# Patient Record
Sex: MALE | Race: OTHER | NOT HISPANIC OR LATINO
[De-identification: names, ages, dates, MRNs, and addresses within clinical notes are randomized per-mention and may not be internally consistent; named-entity substitution may affect disease eponyms.]

---

## 2020-07-30 ENCOUNTER — TRANSCRIPTION ENCOUNTER (OUTPATIENT)
Age: 60
End: 2020-07-30

## 2020-07-30 ENCOUNTER — APPOINTMENT (OUTPATIENT)
Dept: UROLOGY | Facility: CLINIC | Age: 60
End: 2020-07-30
Payer: MEDICARE

## 2020-07-30 VITALS
SYSTOLIC BLOOD PRESSURE: 146 MMHG | DIASTOLIC BLOOD PRESSURE: 85 MMHG | RESPIRATION RATE: 17 BRPM | WEIGHT: 190 LBS | HEIGHT: 68 IN | BODY MASS INDEX: 28.79 KG/M2 | HEART RATE: 79 BPM

## 2020-07-30 VITALS — TEMPERATURE: 98 F

## 2020-07-30 PROCEDURE — 99204 OFFICE O/P NEW MOD 45 MIN: CPT

## 2020-07-30 RX ORDER — TAMSULOSIN HYDROCHLORIDE 0.4 MG/1
0.4 CAPSULE ORAL
Refills: 0 | Status: ACTIVE | COMMUNITY

## 2020-07-30 RX ORDER — FINASTERIDE 5 MG/1
5 TABLET, FILM COATED ORAL
Refills: 0 | Status: ACTIVE | COMMUNITY

## 2020-07-30 NOTE — PHYSICAL EXAM
[General Appearance - Well Developed] : well developed [General Appearance - Well Nourished] : well nourished [Normal Appearance] : normal appearance [Well Groomed] : well groomed [General Appearance - In No Acute Distress] : no acute distress [Edema] : no peripheral edema [Respiration, Rhythm And Depth] : normal respiratory rhythm and effort [Exaggerated Use Of Accessory Muscles For Inspiration] : no accessory muscle use [Abdomen Soft] : soft [Costovertebral Angle Tenderness] : no ~M costovertebral angle tenderness [Abdomen Tenderness] : non-tender [Urethral Meatus] : meatus normal [Scrotum] : the scrotum was normal [Urinary Bladder Findings] : the bladder was normal on palpation [Testes Mass (___cm)] : there were no testicular masses [Normal Station and Gait] : the gait and station were normal for the patient's age [] : no rash [No Focal Deficits] : no focal deficits [Oriented To Time, Place, And Person] : oriented to person, place, and time [Affect] : the affect was normal [Mood] : the mood was normal [Not Anxious] : not anxious [No Palpable Adenopathy] : no palpable adenopathy

## 2020-08-01 LAB
APPEARANCE: ABNORMAL
BACTERIA UR CULT: NORMAL
BACTERIA: NEGATIVE
BILIRUBIN URINE: ABNORMAL
BLOOD URINE: ABNORMAL
COLOR: ABNORMAL
GLUCOSE QUALITATIVE U: ABNORMAL
HYALINE CASTS: 0 /LPF
KETONES URINE: ABNORMAL
LEUKOCYTE ESTERASE URINE: ABNORMAL
MICROSCOPIC-UA: NORMAL
NITRITE URINE: POSITIVE
PH URINE: 7
PROTEIN URINE: ABNORMAL
RED BLOOD CELLS URINE: >720 /HPF
SPECIFIC GRAVITY URINE: 1.02
SQUAMOUS EPITHELIAL CELLS: 0 /HPF
UROBILINOGEN URINE: ABNORMAL
WHITE BLOOD CELLS URINE: 30 /HPF

## 2020-08-03 LAB — URINE CYTOLOGY: NORMAL

## 2020-08-04 LAB
PSA FREE FLD-MCNC: 25 %
PSA FREE SERPL-MCNC: 2.42 NG/ML
PSA SERPL-MCNC: 9.83 NG/ML

## 2020-08-04 NOTE — LETTER BODY
[FreeTextEntry1] : Venkata Garza MD\par [address] [facility]\[place] [address]\par \par Dear Dr. Garza,\par \par Cody Patton presents to the office today.  He is a 60-year-old man who is here reporting a history of gross hematuria for over 1 month.  He is passing blood clots and also feels a burning sensation at times with urination.  He has not had any noticeable odor of the urine.  He did take Cipro recently prescribed to him but did not have any improvement in the hematuria.  He stopped the Cipro about 2 or 3 weeks ago.  The hematuria has been intermittent but persistent.  \par \par His urologic history is notable for a prior issue of urinary retention.  He had a transurethral resection of the prostate performed in 2012 for retention when he was living in [place].  He said at that time he also was living with a catheter in place for about 6 months prior to the operation.  He says that the urinary stream does remain somewhat weak but he is urinating comfortably and does not have any pain or hesitancy at baseline.  He feels as though he does empty his bladder to completion.\par \par I did collect a urine sample today.  The color was dark red consistent with blood in the urine.  It was sent for culture, urinalysis, and cytology.  We did discuss the wide range of conditions associated with gross hematuria including both benign and malignant pathology.  I would strongly advise him to undergo additional evaluation to help determine the cause of the persistent blood in the urine.  We will pursue a CT urogram and cystoscopy for further work-up.  I explained the rationale for both of these tests to the patient.\par \par I also recommended a PSA level.  The PSA did come back elevated at 9.83 with 25% free fraction.  I am not yet sure what to make of this level given his other ongoing symptoms.  He may potentially have very significant prostate enlargement and gross hematuria associated with this enlargement.  Nonetheless, further work-up is indicated to help rule out other potential pathology.  He communicates his understanding of this plan and is in agreement.  He will undergo the CT urogram and I will see him back in the office for cystoscopy and review of those results.\par \par Thank you very much for the kind referral.  Please do not hesitate to contact me with any questions or concerns.\par \par Sincerely,\par \par \par \par \par Bharathi Townsend MD, FACS\par  of Urology\par Residency \par [facility]\par \par [facility]\par Director of Robotics and Minimally Invasive Surgery\par [address] [place] [address]\[place] [address]\par P: [phone]\par F: [phone]\par [facility]

## 2020-08-04 NOTE — REVIEW OF SYSTEMS
[Negative] : Endocrine [Urine Infection (bladder/kidney)] : bladder/kidney infection [Told you have blood in urine on a urine test] : told blood was present in a urine test [Slow urine stream] : slow urine stream [Discharge from urine canal] : discharge from urine canal [Joint Pain] : joint pain [Anxiety] : anxiety [Depression] : depression [FreeTextEntry2] : Frequent Urination

## 2020-08-11 ENCOUNTER — APPOINTMENT (OUTPATIENT)
Dept: UROLOGY | Facility: CLINIC | Age: 60
End: 2020-08-11
Payer: MEDICARE

## 2020-08-11 ENCOUNTER — OUTPATIENT (OUTPATIENT)
Dept: OUTPATIENT SERVICES | Facility: HOSPITAL | Age: 60
LOS: 1 days | End: 2020-08-11
Payer: MEDICARE

## 2020-08-11 ENCOUNTER — APPOINTMENT (OUTPATIENT)
Dept: CT IMAGING | Facility: IMAGING CENTER | Age: 60
End: 2020-08-11
Payer: MEDICARE

## 2020-08-11 ENCOUNTER — RESULT REVIEW (OUTPATIENT)
Age: 60
End: 2020-08-11

## 2020-08-11 VITALS — TEMPERATURE: 97.4 F

## 2020-08-11 VITALS
TEMPERATURE: 97 F | DIASTOLIC BLOOD PRESSURE: 89 MMHG | RESPIRATION RATE: 17 BRPM | SYSTOLIC BLOOD PRESSURE: 125 MMHG | HEART RATE: 75 BPM

## 2020-08-11 DIAGNOSIS — R31.0 GROSS HEMATURIA: ICD-10-CM

## 2020-08-11 DIAGNOSIS — R35.0 FREQUENCY OF MICTURITION: ICD-10-CM

## 2020-08-11 DIAGNOSIS — R30.0 DYSURIA: ICD-10-CM

## 2020-08-11 PROCEDURE — 99214 OFFICE O/P EST MOD 30 MIN: CPT | Mod: 25

## 2020-08-11 PROCEDURE — 52000 CYSTOURETHROSCOPY: CPT

## 2020-08-11 PROCEDURE — 74178 CT ABD&PLV WO CNTR FLWD CNTR: CPT

## 2020-08-11 PROCEDURE — 74178 CT ABD&PLV WO CNTR FLWD CNTR: CPT | Mod: 26

## 2020-08-14 DIAGNOSIS — R31.0 GROSS HEMATURIA: ICD-10-CM

## 2020-08-14 DIAGNOSIS — R30.0 DYSURIA: ICD-10-CM

## 2020-08-14 DIAGNOSIS — N40.1 BENIGN PROSTATIC HYPERPLASIA WITH LOWER URINARY TRACT SYMPTOMS: ICD-10-CM

## 2020-08-19 ENCOUNTER — OUTPATIENT (OUTPATIENT)
Dept: OUTPATIENT SERVICES | Facility: HOSPITAL | Age: 60
LOS: 1 days | End: 2020-08-19
Payer: MEDICARE

## 2020-08-19 VITALS
HEIGHT: 67 IN | DIASTOLIC BLOOD PRESSURE: 94 MMHG | SYSTOLIC BLOOD PRESSURE: 142 MMHG | HEART RATE: 62 BPM | RESPIRATION RATE: 16 BRPM | OXYGEN SATURATION: 98 % | TEMPERATURE: 96 F | WEIGHT: 188.94 LBS

## 2020-08-19 DIAGNOSIS — R31.0 GROSS HEMATURIA: ICD-10-CM

## 2020-08-19 DIAGNOSIS — M12.9 ARTHROPATHY, UNSPECIFIED: Chronic | ICD-10-CM

## 2020-08-19 DIAGNOSIS — N40.0 BENIGN PROSTATIC HYPERPLASIA WITHOUT LOWER URINARY TRACT SYMPTOMS: ICD-10-CM

## 2020-08-19 DIAGNOSIS — M17.11 UNILATERAL PRIMARY OSTEOARTHRITIS, RIGHT KNEE: Chronic | ICD-10-CM

## 2020-08-19 DIAGNOSIS — I10 ESSENTIAL (PRIMARY) HYPERTENSION: ICD-10-CM

## 2020-08-19 LAB
ANION GAP SERPL CALC-SCNC: 14 MMO/L — SIGNIFICANT CHANGE UP (ref 7–14)
BLD GP AB SCN SERPL QL: NEGATIVE — SIGNIFICANT CHANGE UP
BUN SERPL-MCNC: 14 MG/DL — SIGNIFICANT CHANGE UP (ref 7–23)
CALCIUM SERPL-MCNC: 10.1 MG/DL — SIGNIFICANT CHANGE UP (ref 8.4–10.5)
CHLORIDE SERPL-SCNC: 103 MMOL/L — SIGNIFICANT CHANGE UP (ref 98–107)
CO2 SERPL-SCNC: 25 MMOL/L — SIGNIFICANT CHANGE UP (ref 22–31)
CREAT SERPL-MCNC: 0.81 MG/DL — SIGNIFICANT CHANGE UP (ref 0.5–1.3)
GLUCOSE SERPL-MCNC: 98 MG/DL — SIGNIFICANT CHANGE UP (ref 70–99)
HCT VFR BLD CALC: 38 % — LOW (ref 39–50)
HGB BLD-MCNC: 12.1 G/DL — LOW (ref 13–17)
MCHC RBC-ENTMCNC: 27.4 PG — SIGNIFICANT CHANGE UP (ref 27–34)
MCHC RBC-ENTMCNC: 31.8 % — LOW (ref 32–36)
MCV RBC AUTO: 86.2 FL — SIGNIFICANT CHANGE UP (ref 80–100)
NRBC # FLD: 0 K/UL — SIGNIFICANT CHANGE UP (ref 0–0)
PLATELET # BLD AUTO: 341 K/UL — SIGNIFICANT CHANGE UP (ref 150–400)
PMV BLD: 10 FL — SIGNIFICANT CHANGE UP (ref 7–13)
POTASSIUM SERPL-MCNC: 4.6 MMOL/L — SIGNIFICANT CHANGE UP (ref 3.5–5.3)
POTASSIUM SERPL-SCNC: 4.6 MMOL/L — SIGNIFICANT CHANGE UP (ref 3.5–5.3)
RBC # BLD: 4.41 M/UL — SIGNIFICANT CHANGE UP (ref 4.2–5.8)
RBC # FLD: 12.4 % — SIGNIFICANT CHANGE UP (ref 10.3–14.5)
RH IG SCN BLD-IMP: POSITIVE — SIGNIFICANT CHANGE UP
SODIUM SERPL-SCNC: 142 MMOL/L — SIGNIFICANT CHANGE UP (ref 135–145)
WBC # BLD: 7.55 K/UL — SIGNIFICANT CHANGE UP (ref 3.8–10.5)
WBC # FLD AUTO: 7.55 K/UL — SIGNIFICANT CHANGE UP (ref 3.8–10.5)

## 2020-08-19 PROCEDURE — 93010 ELECTROCARDIOGRAM REPORT: CPT

## 2020-08-19 RX ORDER — SODIUM CHLORIDE 9 MG/ML
1000 INJECTION, SOLUTION INTRAVENOUS
Refills: 0 | Status: DISCONTINUED | OUTPATIENT
Start: 2020-08-24 | End: 2020-08-24

## 2020-08-19 NOTE — H&P PST ADULT - NSICDXPASTMEDICALHX_GEN_ALL_CORE_FT
PAST MEDICAL HISTORY:  Anxiety and depression     Enlarged prostate     Herniated cervical disc     Leg pain right side    Lumbar herniated disc     Neck pain     Shoulder pain

## 2020-08-19 NOTE — H&P PST ADULT - NSICDXPASTSURGICALHX_GEN_ALL_CORE_FT
PAST SURGICAL HISTORY:  Arthropathy right shoulder    Arthropathy left shoulder surgery    Arthropathy right ankle surgery    Arthropathy of right knee surgery

## 2020-08-19 NOTE — H&P PST ADULT - NSANTHOSAYNRD_GEN_A_CORE
No. CRISTOBAL screening performed.  STOP BANG Legend: 0-2 = LOW Risk; 3-4 = INTERMEDIATE Risk; 5-8 = HIGH Risk

## 2020-08-19 NOTE — H&P PST ADULT - HISTORY OF PRESENT ILLNESS
This is a 61 y/o male who presents with hematuria. Evaluated by MD who did office scoping and subsequent CT scan confirming pathology. Scheduled for robotic suprapubic prostatectomy This is a 61 y/o male who presents with hematuria. Evaluated by MD who did office scoping and subsequent CT scan confirming pathology. To have preop MRI.  Scheduled for robotic suprapubic prostatectomy

## 2020-08-19 NOTE — H&P PST ADULT - NSICDXPROBLEM_GEN_ALL_CORE_FT
PROBLEM DIAGNOSES  Problem: Enlarged prostate  Assessment and Plan:     Problem: Hypertension  Assessment and Plan: PROBLEM DIAGNOSES  Problem: Enlarged prostate  Assessment and Plan: This is a 59 y/o male who is scheduled for robotic suprapubic prostatectomy on 8-24-20  * Given preop and  instructions with good teach back and patient verbalized understanding    Problem: Hypertension  Assessment and Plan: Await medical evaluation with pcp due to elevated BP at PAST with no h/o hypertension PROBLEM DIAGNOSES  Problem: Enlarged prostate  Assessment and Plan: This is a 59 y/o male who is scheduled for robotic suprapubic prostatectomy on 8-24-20  * Given preop and  instructions with good teach back and patient verbalized understanding    Problem: Hypertension  Assessment and Plan: Await medical evaluation with pcp due to elevated BP at PAST with no h/o hypertension -- notified Maryann in surgeon's office

## 2020-08-21 ENCOUNTER — OUTPATIENT (OUTPATIENT)
Dept: OUTPATIENT SERVICES | Facility: HOSPITAL | Age: 60
LOS: 1 days | End: 2020-08-21
Payer: MEDICARE

## 2020-08-21 ENCOUNTER — APPOINTMENT (OUTPATIENT)
Dept: MRI IMAGING | Facility: CLINIC | Age: 60
End: 2020-08-21
Payer: MEDICARE

## 2020-08-21 ENCOUNTER — APPOINTMENT (OUTPATIENT)
Dept: DISASTER EMERGENCY | Facility: CLINIC | Age: 60
End: 2020-08-21

## 2020-08-21 DIAGNOSIS — M12.9 ARTHROPATHY, UNSPECIFIED: Chronic | ICD-10-CM

## 2020-08-21 DIAGNOSIS — M17.11 UNILATERAL PRIMARY OSTEOARTHRITIS, RIGHT KNEE: Chronic | ICD-10-CM

## 2020-08-21 DIAGNOSIS — R31.0 GROSS HEMATURIA: ICD-10-CM

## 2020-08-21 DIAGNOSIS — N40.1 BENIGN PROSTATIC HYPERPLASIA WITH LOWER URINARY TRACT SYMPTOMS: ICD-10-CM

## 2020-08-21 PROBLEM — M51.26 OTHER INTERVERTEBRAL DISC DISPLACEMENT, LUMBAR REGION: Chronic | Status: ACTIVE | Noted: 2020-08-19

## 2020-08-21 PROBLEM — F41.9 ANXIETY DISORDER, UNSPECIFIED: Chronic | Status: ACTIVE | Noted: 2020-08-19

## 2020-08-21 PROBLEM — N40.0 BENIGN PROSTATIC HYPERPLASIA WITHOUT LOWER URINARY TRACT SYMPTOMS: Chronic | Status: ACTIVE | Noted: 2020-08-19

## 2020-08-21 PROBLEM — M50.20 OTHER CERVICAL DISC DISPLACEMENT, UNSPECIFIED CERVICAL REGION: Chronic | Status: ACTIVE | Noted: 2020-08-19

## 2020-08-21 PROCEDURE — 72197 MRI PELVIS W/O & W/DYE: CPT | Mod: 26

## 2020-08-21 PROCEDURE — A9585: CPT

## 2020-08-21 PROCEDURE — 72197 MRI PELVIS W/O & W/DYE: CPT

## 2020-08-21 NOTE — ASU PATIENT PROFILE, ADULT - PSH
Arthropathy  right ankle surgery  Arthropathy  left shoulder surgery  Arthropathy  right shoulder  Arthropathy of right knee  surgery

## 2020-08-21 NOTE — ASU PATIENT PROFILE, ADULT - PMH
Anxiety and depression    Enlarged prostate    Herniated cervical disc    Leg pain  right side  Lumbar herniated disc    Neck pain    Shoulder pain

## 2020-08-22 LAB — SARS-COV-2 N GENE NPH QL NAA+PROBE: NOT DETECTED

## 2020-08-23 ENCOUNTER — TRANSCRIPTION ENCOUNTER (OUTPATIENT)
Age: 60
End: 2020-08-23

## 2020-08-24 ENCOUNTER — RESULT REVIEW (OUTPATIENT)
Age: 60
End: 2020-08-24

## 2020-08-24 ENCOUNTER — INPATIENT (INPATIENT)
Facility: HOSPITAL | Age: 60
LOS: 0 days | Discharge: HOME | End: 2020-08-25
Attending: UROLOGY | Admitting: UROLOGY
Payer: MEDICARE

## 2020-08-24 ENCOUNTER — APPOINTMENT (OUTPATIENT)
Dept: UROLOGY | Facility: HOSPITAL | Age: 60
End: 2020-08-24

## 2020-08-24 VITALS
WEIGHT: 184.97 LBS | RESPIRATION RATE: 16 BRPM | SYSTOLIC BLOOD PRESSURE: 130 MMHG | DIASTOLIC BLOOD PRESSURE: 78 MMHG | HEIGHT: 67 IN | OXYGEN SATURATION: 100 % | TEMPERATURE: 98 F | HEART RATE: 85 BPM

## 2020-08-24 DIAGNOSIS — M12.9 ARTHROPATHY, UNSPECIFIED: Chronic | ICD-10-CM

## 2020-08-24 DIAGNOSIS — M17.11 UNILATERAL PRIMARY OSTEOARTHRITIS, RIGHT KNEE: Chronic | ICD-10-CM

## 2020-08-24 DIAGNOSIS — R31.0 GROSS HEMATURIA: ICD-10-CM

## 2020-08-24 LAB — RH IG SCN BLD-IMP: POSITIVE — SIGNIFICANT CHANGE UP

## 2020-08-24 PROCEDURE — 88305 TISSUE EXAM BY PATHOLOGIST: CPT | Mod: 26

## 2020-08-24 PROCEDURE — 55899 UNLISTED PX MALE GENITAL SYS: CPT

## 2020-08-24 PROCEDURE — 88307 TISSUE EXAM BY PATHOLOGIST: CPT | Mod: 26

## 2020-08-24 PROCEDURE — 88332 PATH CONSLTJ SURG EA ADD BLK: CPT | Mod: 26

## 2020-08-24 PROCEDURE — 88331 PATH CONSLTJ SURG 1 BLK 1SPC: CPT | Mod: 26

## 2020-08-24 RX ORDER — HEPARIN SODIUM 5000 [USP'U]/ML
5000 INJECTION INTRAVENOUS; SUBCUTANEOUS EVERY 8 HOURS
Refills: 0 | Status: DISCONTINUED | OUTPATIENT
Start: 2020-08-24 | End: 2020-08-25

## 2020-08-24 RX ORDER — MULTIVIT-MIN/FERROUS GLUCONATE 9 MG/15 ML
1 LIQUID (ML) ORAL DAILY
Refills: 0 | Status: DISCONTINUED | OUTPATIENT
Start: 2020-08-24 | End: 2020-08-24

## 2020-08-24 RX ORDER — CEFAZOLIN SODIUM 1 G
1000 VIAL (EA) INJECTION EVERY 8 HOURS
Refills: 0 | Status: COMPLETED | OUTPATIENT
Start: 2020-08-24 | End: 2020-08-25

## 2020-08-24 RX ORDER — BENZOCAINE AND MENTHOL 5; 1 G/100ML; G/100ML
1 LIQUID ORAL EVERY 6 HOURS
Refills: 0 | Status: DISCONTINUED | OUTPATIENT
Start: 2020-08-24 | End: 2020-08-25

## 2020-08-24 RX ORDER — SODIUM CHLORIDE 9 MG/ML
1000 INJECTION, SOLUTION INTRAVENOUS
Refills: 0 | Status: DISCONTINUED | OUTPATIENT
Start: 2020-08-24 | End: 2020-08-25

## 2020-08-24 RX ORDER — SENNA PLUS 8.6 MG/1
2 TABLET ORAL AT BEDTIME
Refills: 0 | Status: DISCONTINUED | OUTPATIENT
Start: 2020-08-24 | End: 2020-08-25

## 2020-08-24 RX ORDER — OXYCODONE HYDROCHLORIDE 5 MG/1
10 TABLET ORAL EVERY 4 HOURS
Refills: 0 | Status: DISCONTINUED | OUTPATIENT
Start: 2020-08-24 | End: 2020-08-25

## 2020-08-24 RX ORDER — OXYCODONE HYDROCHLORIDE 5 MG/1
5 TABLET ORAL EVERY 4 HOURS
Refills: 0 | Status: DISCONTINUED | OUTPATIENT
Start: 2020-08-24 | End: 2020-08-25

## 2020-08-24 RX ORDER — LIDOCAINE 4 G/100G
1 CREAM TOPICAL
Refills: 0 | Status: DISCONTINUED | OUTPATIENT
Start: 2020-08-24 | End: 2020-08-25

## 2020-08-24 RX ADMIN — SENNA PLUS 2 TABLET(S): 8.6 TABLET ORAL at 22:00

## 2020-08-24 RX ADMIN — OXYCODONE HYDROCHLORIDE 10 MILLIGRAM(S): 5 TABLET ORAL at 19:27

## 2020-08-24 RX ADMIN — HEPARIN SODIUM 5000 UNIT(S): 5000 INJECTION INTRAVENOUS; SUBCUTANEOUS at 22:00

## 2020-08-24 RX ADMIN — SODIUM CHLORIDE 125 MILLILITER(S): 9 INJECTION, SOLUTION INTRAVENOUS at 21:59

## 2020-08-24 RX ADMIN — Medication 100 MILLIGRAM(S): at 22:00

## 2020-08-24 NOTE — PROGRESS NOTE ADULT - ASSESSMENT
s/p Robotic SPP, stable in PACU, POD #0  -Pain management  -IVF  -Clear liquid diet  -Labs in AM  -DVT prophylaxis

## 2020-08-24 NOTE — PATIENT PROFILE ADULT - NSPROMUTANXFEARFT_GEN_A_NUR
What Type Of Note Output Would You Prefer (Optional)?: Bullet Format What Is The Reason For Today's Visit?: Full Body Skin Examination What Is The Reason For Today's Visit? (Being Monitored For X): concerning skin lesions on an annual basis How Severe Are Your Spot(S)?: mild none

## 2020-08-24 NOTE — BRIEF OPERATIVE NOTE - NSICDXBRIEFPROCEDURE_GEN_ALL_CORE_FT
PROCEDURES:  Robot-assisted laparoscopic suprapubic prostatectomy 24-Aug-2020 17:24:45  Jaspreet Glynn

## 2020-08-24 NOTE — PROGRESS NOTE ADULT - SUBJECTIVE AND OBJECTIVE BOX
Subjective  Patient is complaining of mild abdominal pain, no nausea, no vomiting.    Objective    Vital signs  T(F): , Max: 98.3 (08-24-20 @ 10:41)  HR: 68 (08-24-20 @ 19:00)  BP: 114/68 (08-24-20 @ 19:00)  SpO2: 97% (08-24-20 @ 19:00)  Wt(kg): --    Output     08-24 @ 07:01  -  08-24 @ 20:30  --------------------------------------------------------  IN: 250 mL / OUT: 10 mL / NET: 240 mL        Gen: No distress observed  Abd: Soft, non-distended, + small dressings c/d/i, + right-sided ZAHIDA serosanguinous  : + collado, on low CBI, light pink    Labs        Urine Cx: ?  Blood Cx: ?    Imaging

## 2020-08-25 ENCOUNTER — TRANSCRIPTION ENCOUNTER (OUTPATIENT)
Age: 60
End: 2020-08-25

## 2020-08-25 VITALS
OXYGEN SATURATION: 97 % | HEART RATE: 67 BPM | SYSTOLIC BLOOD PRESSURE: 109 MMHG | RESPIRATION RATE: 18 BRPM | DIASTOLIC BLOOD PRESSURE: 70 MMHG | TEMPERATURE: 98 F

## 2020-08-25 DIAGNOSIS — Z29.9 ENCOUNTER FOR PROPHYLACTIC MEASURES, UNSPECIFIED: ICD-10-CM

## 2020-08-25 DIAGNOSIS — F41.9 ANXIETY DISORDER, UNSPECIFIED: ICD-10-CM

## 2020-08-25 LAB
ANION GAP SERPL CALC-SCNC: 11 MMO/L — SIGNIFICANT CHANGE UP (ref 7–14)
BUN SERPL-MCNC: 12 MG/DL — SIGNIFICANT CHANGE UP (ref 7–23)
CALCIUM SERPL-MCNC: 8.7 MG/DL — SIGNIFICANT CHANGE UP (ref 8.4–10.5)
CHLORIDE SERPL-SCNC: 103 MMOL/L — SIGNIFICANT CHANGE UP (ref 98–107)
CO2 SERPL-SCNC: 25 MMOL/L — SIGNIFICANT CHANGE UP (ref 22–31)
CREAT FLD-MCNC: 0.79 MG/DL — SIGNIFICANT CHANGE UP
CREAT SERPL-MCNC: 0.81 MG/DL — SIGNIFICANT CHANGE UP (ref 0.5–1.3)
GLUCOSE SERPL-MCNC: 121 MG/DL — HIGH (ref 70–99)
HCT VFR BLD CALC: 33.3 % — LOW (ref 39–50)
HGB BLD-MCNC: 10.4 G/DL — LOW (ref 13–17)
MCHC RBC-ENTMCNC: 26.7 PG — LOW (ref 27–34)
MCHC RBC-ENTMCNC: 31.2 % — LOW (ref 32–36)
MCV RBC AUTO: 85.4 FL — SIGNIFICANT CHANGE UP (ref 80–100)
NRBC # FLD: 0 K/UL — SIGNIFICANT CHANGE UP (ref 0–0)
PLATELET # BLD AUTO: 274 K/UL — SIGNIFICANT CHANGE UP (ref 150–400)
PMV BLD: 10.1 FL — SIGNIFICANT CHANGE UP (ref 7–13)
POTASSIUM SERPL-MCNC: 4.1 MMOL/L — SIGNIFICANT CHANGE UP (ref 3.5–5.3)
POTASSIUM SERPL-SCNC: 4.1 MMOL/L — SIGNIFICANT CHANGE UP (ref 3.5–5.3)
RBC # BLD: 3.9 M/UL — LOW (ref 4.2–5.8)
RBC # FLD: 12.2 % — SIGNIFICANT CHANGE UP (ref 10.3–14.5)
SODIUM SERPL-SCNC: 139 MMOL/L — SIGNIFICANT CHANGE UP (ref 135–145)
WBC # BLD: 9.64 K/UL — SIGNIFICANT CHANGE UP (ref 3.8–10.5)
WBC # FLD AUTO: 9.64 K/UL — SIGNIFICANT CHANGE UP (ref 3.8–10.5)

## 2020-08-25 PROCEDURE — 99222 1ST HOSP IP/OBS MODERATE 55: CPT

## 2020-08-25 RX ORDER — SODIUM CHLORIDE 9 MG/ML
1000 INJECTION, SOLUTION INTRAVENOUS
Refills: 0 | Status: DISCONTINUED | OUTPATIENT
Start: 2020-08-25 | End: 2020-08-25

## 2020-08-25 RX ORDER — OXYCODONE HYDROCHLORIDE 5 MG/1
1 TABLET ORAL
Qty: 12 | Refills: 0
Start: 2020-08-25 | End: 2020-08-27

## 2020-08-25 RX ORDER — LIDOCAINE 4 G/100G
1 CREAM TOPICAL
Qty: 0 | Refills: 0 | DISCHARGE
Start: 2020-08-25

## 2020-08-25 RX ADMIN — LIDOCAINE 1 APPLICATION(S): 4 CREAM TOPICAL at 05:11

## 2020-08-25 RX ADMIN — Medication 100 MILLIGRAM(S): at 13:13

## 2020-08-25 RX ADMIN — OXYCODONE HYDROCHLORIDE 5 MILLIGRAM(S): 5 TABLET ORAL at 11:06

## 2020-08-25 RX ADMIN — OXYCODONE HYDROCHLORIDE 5 MILLIGRAM(S): 5 TABLET ORAL at 05:11

## 2020-08-25 RX ADMIN — OXYCODONE HYDROCHLORIDE 5 MILLIGRAM(S): 5 TABLET ORAL at 11:42

## 2020-08-25 RX ADMIN — HEPARIN SODIUM 5000 UNIT(S): 5000 INJECTION INTRAVENOUS; SUBCUTANEOUS at 05:11

## 2020-08-25 RX ADMIN — OXYCODONE HYDROCHLORIDE 5 MILLIGRAM(S): 5 TABLET ORAL at 06:00

## 2020-08-25 RX ADMIN — Medication 1 TABLET(S): at 13:13

## 2020-08-25 RX ADMIN — Medication 100 MILLIGRAM(S): at 05:11

## 2020-08-25 NOTE — DISCHARGE NOTE PROVIDER - HOSPITAL COURSE
Pt had robotic SPP yesterday; did well; ambulating; salo clears, but has not passed gas; pain controlled with oral meds; ZAHIDA removed; self-advance diet at home; labs stable; f/u next week for collado removal.

## 2020-08-25 NOTE — DISCHARGE NOTE NURSING/CASE MANAGEMENT/SOCIAL WORK - NSDCPNINST_GEN_ALL_CORE
Notify Dr Townsend if you experience any increase in pain not relieved with pain medication, any redness drainage or swelling around incisions, any fever >100.5 or if you develop any bright red blood or clots in urine.  No heavy lifting or straining.  Advance your diet slowly after GI function progressing.  Renee care as instructed, use leg bag during the day and large drainage bag at night.  Drink plenty of fluids.  Use over the counter stool softeners to assist with constipation which can be a side effect of your pain medication.

## 2020-08-25 NOTE — DISCHARGE NOTE NURSING/CASE MANAGEMENT/SOCIAL WORK - NSDCPECAREGIVERED_GEN_ALL_CORE
carenotes on suprapubic prostatectomy, collado care, d/c medications, managing pain after surgery handout, side effects pamphlet carenotes on collado care, prostatectomy, pain after surgery and side effects handout, d/c medications

## 2020-08-25 NOTE — PROGRESS NOTE ADULT - ASSESSMENT
A/P  61 yo M now s/p robotic suprapubic prostatectomy on 8/24/20. Doing well postop    POD1:   - CBI clamped this AM. F/u later this morning to assess urine color while off CBI  - F/u AM labs 8/25   - Continue with CLD. AWROBF  - D/c IVF given PO intake   - OOB, IS  - SQH for VTE ppx A/P  59 yo M now s/p robotic suprapubic prostatectomy on 8/24/20. Doing well postop    POD1:   - CBI clamped this AM. F/u later this morning to assess urine color while off CBI  - F/u AM labs 8/25   - Continue with CLD. AWROBF  - Switch to mIVF   - Dulcolax   - OOB, IS  - SQH for VTE ppx

## 2020-08-25 NOTE — DISCHARGE NOTE PROVIDER - NSDCMRMEDTOKEN_GEN_ALL_CORE_FT
Centrum oral tablet: 1 tab(s) orally once a daylast dose 8/19/2020  lidocaine 5% topical ointment: 1 application topically every 3 hours  oxyCODONE 5 mg oral tablet: 1 tab(s) orally every 6 hours, As Needed -Mild Pain (1 - 3) MDD:4

## 2020-08-25 NOTE — DISCHARGE NOTE NURSING/CASE MANAGEMENT/SOCIAL WORK - PATIENT PORTAL LINK FT
You can access the FollowMyHealth Patient Portal offered by [facility] by registering at the following website: [email or web address]. By joining [employer]’s FollowMyHealth portal, you will also be able to view your health information using other applications (apps) compatible with our system.

## 2020-08-25 NOTE — DISCHARGE NOTE PROVIDER - CARE PROVIDER_API CALL
Bharathi Townsend  UROLOGY  [address] [place] [address], [place] [id number]  [place] [id number]  Phone: [phone]  Fax: [phone]  Follow Up Time: 1 week

## 2020-08-25 NOTE — DISCHARGE NOTE NURSING/CASE MANAGEMENT/SOCIAL WORK - NSDPDISTO_GEN_ALL_CORE
Home Home/stable, tolerating diet, collado to leg bag drainage draining without difficulty, tolerating clear liquid diet, abdominal steri strips clean dry and intact, dsd to drain site intact, oob without difficulty

## 2020-08-25 NOTE — CONSULT NOTE ADULT - PROBLEM SELECTOR RECOMMENDATION 9
S/P Robotic SPP, POD#1, doing well  -Management as per   -pain control  -Bowel regimen  -Incentive spirometer  -OOB and ambulate  -DVT prophylaxis

## 2020-08-25 NOTE — CONSULT NOTE ADULT - SUBJECTIVE AND OBJECTIVE BOX
Patient is a 60y old  Male who presents with a chief complaint of s/p robotic suprapubic prostatectomy (25 Aug 2020 06:20)      HPI:  This is a 59 y/o male who presents withh/o anxiety, off meds for 3 months, BPH admitted for robotic suprapubic prostatectomy. S/P OR, POD#1. Pt has no complaints at this time, denies any CP or SOB, no anxiety, No flatus or BM      History limited due to: [ ] Dementia  [ ] Delirium  [ ] Condition    Pain Symptoms if applicable:             	                         none	     Pain:	                            0	  Location:	  Modifying factors:	  Associated symptoms:	    Function: [ x] Independent  [ ] Assistance  [ ] Total care  [ ] Non-ambulatory    Allergies    No Known Allergies    Intolerances        HOME MEDICATIONS: [ ] Reviewed    MEDICATIONS  (STANDING):  ceFAZolin   IVPB 1000 milliGRAM(s) IV Intermittent every 8 hours  dextrose 5% + sodium chloride 0.45%. 1000 milliLiter(s) (75 mL/Hr) IV Continuous <Continuous>  heparin   Injectable 5000 Unit(s) SubCutaneous every 8 hours  lidocaine 5% Ointment 1 Application(s) Topical every 3 hours  multivitamin 1 Tablet(s) Oral daily  senna 2 Tablet(s) Oral at bedtime    MEDICATIONS  (PRN):  benzocaine 15 mG/menthol 3.6 mG (Sugar-Free) Lozenge 1 Lozenge Oral every 6 hours PRN Sore Throat  oxyCODONE    IR 5 milliGRAM(s) Oral every 4 hours PRN Mild Pain (1 - 3)  oxyCODONE    IR 10 milliGRAM(s) Oral every 4 hours PRN Severe Pain (7 - 10)  oxyCODONE    IR 5 milliGRAM(s) Oral every 4 hours PRN Moderate Pain (4 - 6)      PAST MEDICAL & SURGICAL HISTORY:  Anxiety and depression  Herniated cervical disc  Lumbar herniated disc  Enlarged prostate  Leg pain: right side  Shoulder pain  Neck pain  Arthropathy: right ankle surgery  Arthropathy: left shoulder surgery  Arthropathy: right shoulder  Arthropathy of right knee: surgery  [x ] Reviewed     SOCIAL HISTORY:  Residence: [ ] [place]  [ ] SNF  [x ] Community  [ ] Substance abuse: denies  [ ] Tobacco: denies  [ ] Alcohol use: denies    FAMILY HISTORY:  No pertinent family history in first degree relatives  [ ] No pertinent family history in first degree relatives     REVIEW OF SYSTEMS:    CONSTITUTIONAL: No fever, weight loss, or fatigue  EYES: No eye pain, visual disturbances, or discharge  ENMT:  No difficulty hearing, tinnitus, vertigo; No sinus or throat pain  NECK: No pain or stiffness  BREASTS: No pain, masses, or nipple discharge  RESPIRATORY: No cough, wheezing, chills or hemoptysis; No shortness of breath  CARDIOVASCULAR: No chest pain, palpitations, dizziness, or leg swelling  GASTROINTESTINAL: No abdominal or epigastric pain. No nausea, vomiting, or hematemesis; No diarrhea or constipation. No melena or hematochezia. Post op: no flatus or BM  GENITOURINARY: (+) collado post op   NEUROLOGICAL: No headaches, memory loss, loss of strength, numbness, or tremors  SKIN: No itching, burning, rashes, or lesions   LYMPH NODES: No enlarged glands  ENDOCRINE: No heat or cold intolerance; No hair loss  MUSCULOSKELETAL: No muscle or back pain  PSYCHIATRIC: No depression, anxiety, mood swings, or difficulty sleeping  HEME/LYMPH: No easy bruising, or bleeding gums  ALLERGY AND IMMUNOLOGIC: No hives or eczema    [  ] All other ROS negative  [  ] Unable to obtain due to poor mental status    Vital Signs Last 24 Hrs  T(C): 36.7 (25 Aug 2020 09:02), Max: 36.8 (24 Aug 2020 10:41)  T(F): 98.1 (25 Aug 2020 09:02), Max: 98.3 (24 Aug 2020 10:41)  HR: 60 (25 Aug 2020 09:02) (60 - 85)  BP: 103/68 (25 Aug 2020 09:02) (102/58 - 133/61)  BP(mean): 79 (24 Aug 2020 19:00) (68 - 92)  RR: 18 (25 Aug 2020 09:02) (12 - 18)  SpO2: 99% (25 Aug 2020 09:02) (95% - 100%)    PHYSICAL EXAM:    GENERAL: NAD, well-groomed, well-developed  HEAD:  Atraumatic, Normocephalic  EYES: EOMI, PERRLA, conjunctiva and sclera clear  ENMT: Moist mucous membranes  NECK: Supple, No JVD  RESPIRATORY: Clear to auscultation bilaterally; No rales, rhonchi, wheezing, or rubs  CARDIOVASCULAR: Regular rate and rhythm; No murmurs, rubs, or gallops  GASTROINTESTINAL: Soft, mildly tender, Nondistended; Bowel sounds hypoactive  GENITOURINARY: (+) Collado  EXTREMITIES:  2+ Peripheral Pulses, No clubbing, cyanosis, or edema  NERVOUS SYSTEM:  Alert & Oriented X3; Moving all 4 extremities; No gross sensory deficits  HEME/LYMPH: No lymphadenopathy noted  SKIN: No rashes or lesions; Incisions C/D/I    LABS:                        10.4   9.64  )-----------( 274      ( 25 Aug 2020 06:57 )             33.3     08-25    139  |  103  |  12  ----------------------------<  121<H>  4.1   |  25  |  0.81    Ca    8.7      25 Aug 2020 06:57          CAPILLARY BLOOD GLUCOSE          RADIOLOGY & ADDITIONAL STUDIES:    EKG:   Personally Reviewed:  [ ] YES     Imaging:   Personally Reviewed:  [ ] YES               Consultant(s) notes reviewed:    Care Discussed with Consultant(s)/Other Providers:    Advanced Directives: [ ] DNR  [ ] No feeding tube  [ ] MOLST in chart  [ ] MOLST completed today  [x ] Unknown

## 2020-08-25 NOTE — PROGRESS NOTE ADULT - SUBJECTIVE AND OBJECTIVE BOX
Subjective    Patient seen and examined.  POD1: Doing well overnight. Tolerating CLD. No flatus/BM. Pain is well controlled w/ PRN oxycodone (x1 5mg dose ~5am). CBI with light pink urine, clamped this AM.     Objective    Vital signs  T(F): , Max: 98.3 (08-24-20 @ 10:41)  HR: 64 (08-25-20 @ 05:07)  BP: 102/58 (08-25-20 @ 05:07)  SpO2: 97% (08-25-20 @ 05:07)  Wt(kg): --    Output     08-24 @ 07:01  -  08-25 @ 06:22  --------------------------------------------------------  IN: 250 mL / OUT: 20 mL / NET: 230 mL        General: NAD  CV: Regular rate  Pulm: Normal work of breathing  Abdomen: soft/non-tender/non-distended. all robotic port sites covered with bandages c/d/i. No drainage around wounds. Rt sided ZAHIDA drain with serosang fluid (20cc out in last 24 hrs).   : Renee bag with light pink urine. CBI clamped this morning. 20 Fr 3-way catheter in place      Labs  Pending 8/25 AM Labs       Urine Cx: Preop NG

## 2020-08-25 NOTE — CONSULT NOTE ADULT - ASSESSMENT
This is a 61 y/o male who presents withh/o anxiety, off meds for 3 months, BPH admitted for robotic suprapubic prostatectomy. S/P OR, POD#1.

## 2020-08-25 NOTE — DISCHARGE NOTE PROVIDER - NSDCCPCAREPLAN_GEN_ALL_CORE_FT
PRINCIPAL DISCHARGE DIAGNOSIS  Diagnosis: Enlarged prostate  Assessment and Plan of Treatment: Drink plenty of fluids.  No heavy lifting (greater than 10 pounds) or straining for 4 to 6 weeks.  You may shower, just pat white strips dry, they will fall off in a few weeks. Change dressing at drain site daily or as needed until dry. Do not drive when taking pain medication. Collado care as instructed  Call   office  to schedule a follow up appointment to have collado removed next week  Call the office if you have fever greater than 101,pain not relieved with pain medication, nausea/vomiting.

## 2020-08-28 LAB — SURGICAL PATHOLOGY STUDY: SIGNIFICANT CHANGE UP

## 2020-08-31 ENCOUNTER — OUTPATIENT (OUTPATIENT)
Dept: OUTPATIENT SERVICES | Facility: HOSPITAL | Age: 60
LOS: 1 days | End: 2020-08-31
Payer: MEDICARE

## 2020-08-31 ENCOUNTER — APPOINTMENT (OUTPATIENT)
Dept: UROLOGY | Facility: CLINIC | Age: 60
End: 2020-08-31
Payer: MEDICARE

## 2020-08-31 VITALS — TEMPERATURE: 97.9 F

## 2020-08-31 DIAGNOSIS — N13.8 BENIGN PROSTATIC HYPERPLASIA WITH LOWER URINARY TRACT SYMPMS: ICD-10-CM

## 2020-08-31 DIAGNOSIS — M12.9 ARTHROPATHY, UNSPECIFIED: Chronic | ICD-10-CM

## 2020-08-31 DIAGNOSIS — N39.3 STRESS INCONTINENCE (FEMALE) (MALE): ICD-10-CM

## 2020-08-31 DIAGNOSIS — R31.0 GROSS HEMATURIA: ICD-10-CM

## 2020-08-31 DIAGNOSIS — M17.11 UNILATERAL PRIMARY OSTEOARTHRITIS, RIGHT KNEE: Chronic | ICD-10-CM

## 2020-08-31 DIAGNOSIS — N40.1 BENIGN PROSTATIC HYPERPLASIA WITH LOWER URINARY TRACT SYMPMS: ICD-10-CM

## 2020-08-31 PROCEDURE — 99024 POSTOP FOLLOW-UP VISIT: CPT

## 2020-08-31 PROCEDURE — 51700 IRRIGATION OF BLADDER: CPT

## 2020-08-31 PROCEDURE — 51798 US URINE CAPACITY MEASURE: CPT

## 2020-08-31 RX ORDER — CIPROFLOXACIN HYDROCHLORIDE 500 MG/1
500 TABLET, FILM COATED ORAL
Qty: 6 | Refills: 0 | Status: ACTIVE | COMMUNITY
Start: 2020-08-31 | End: 1900-01-01

## 2020-09-03 DIAGNOSIS — N40.1 BENIGN PROSTATIC HYPERPLASIA WITH LOWER URINARY TRACT SYMPTOMS: ICD-10-CM

## 2020-09-03 DIAGNOSIS — N39.3 STRESS INCONTINENCE (FEMALE) (MALE): ICD-10-CM

## 2020-09-03 DIAGNOSIS — R31.0 GROSS HEMATURIA: ICD-10-CM

## 2020-09-08 ENCOUNTER — APPOINTMENT (OUTPATIENT)
Dept: UROLOGY | Facility: CLINIC | Age: 60
End: 2020-09-08

## 2020-09-16 ENCOUNTER — RX RENEWAL (OUTPATIENT)
Age: 60
End: 2020-09-16

## 2021-09-20 ENCOUNTER — APPOINTMENT (OUTPATIENT)
Dept: SURGERY | Facility: CLINIC | Age: 61
End: 2021-09-20
Payer: MEDICARE

## 2021-09-20 VITALS
HEIGHT: 68 IN | HEART RATE: 82 BPM | WEIGHT: 186 LBS | DIASTOLIC BLOOD PRESSURE: 92 MMHG | SYSTOLIC BLOOD PRESSURE: 146 MMHG | BODY MASS INDEX: 28.19 KG/M2

## 2021-09-20 VITALS — TEMPERATURE: 96.5 F

## 2021-09-20 DIAGNOSIS — Z86.79 PERSONAL HISTORY OF OTHER DISEASES OF THE CIRCULATORY SYSTEM: ICD-10-CM

## 2021-09-20 DIAGNOSIS — Z63.4 DISAPPEARANCE AND DEATH OF FAMILY MEMBER: ICD-10-CM

## 2021-09-20 DIAGNOSIS — Z78.9 OTHER SPECIFIED HEALTH STATUS: ICD-10-CM

## 2021-09-20 PROCEDURE — 99203 OFFICE O/P NEW LOW 30 MIN: CPT

## 2021-09-20 SDOH — SOCIAL STABILITY - SOCIAL INSECURITY: DISSAPEARANCE AND DEATH OF FAMILY MEMBER: Z63.4

## 2021-09-23 PROBLEM — Z86.79 HISTORY OF HYPERTENSION: Status: RESOLVED | Noted: 2021-09-20 | Resolved: 2021-09-23

## 2021-09-23 PROBLEM — Z78.9 NON-SMOKER: Status: ACTIVE | Noted: 2021-09-20

## 2021-09-23 PROBLEM — Z63.4 WIDOW: Status: ACTIVE | Noted: 2021-09-20

## 2021-09-23 RX ORDER — AMLODIPINE BESYLATE 5 MG/1
TABLET ORAL
Refills: 0 | Status: ACTIVE | COMMUNITY

## 2021-09-30 DIAGNOSIS — Z01.818 ENCOUNTER FOR OTHER PREPROCEDURAL EXAMINATION: ICD-10-CM

## 2021-10-04 ENCOUNTER — OUTPATIENT (OUTPATIENT)
Dept: OUTPATIENT SERVICES | Facility: HOSPITAL | Age: 61
LOS: 1 days | End: 2021-10-04
Payer: MEDICARE

## 2021-10-04 VITALS
HEART RATE: 80 BPM | SYSTOLIC BLOOD PRESSURE: 125 MMHG | TEMPERATURE: 99 F | RESPIRATION RATE: 18 BRPM | OXYGEN SATURATION: 97 % | HEIGHT: 68 IN | DIASTOLIC BLOOD PRESSURE: 83 MMHG | WEIGHT: 186.07 LBS

## 2021-10-04 DIAGNOSIS — Z90.79 ACQUIRED ABSENCE OF OTHER GENITAL ORGAN(S): Chronic | ICD-10-CM

## 2021-10-04 DIAGNOSIS — I10 ESSENTIAL (PRIMARY) HYPERTENSION: ICD-10-CM

## 2021-10-04 DIAGNOSIS — K43.2 INCISIONAL HERNIA WITHOUT OBSTRUCTION OR GANGRENE: ICD-10-CM

## 2021-10-04 DIAGNOSIS — M12.9 ARTHROPATHY, UNSPECIFIED: Chronic | ICD-10-CM

## 2021-10-04 DIAGNOSIS — M51.26 OTHER INTERVERTEBRAL DISC DISPLACEMENT, LUMBAR REGION: ICD-10-CM

## 2021-10-04 DIAGNOSIS — Z01.818 ENCOUNTER FOR OTHER PREPROCEDURAL EXAMINATION: ICD-10-CM

## 2021-10-04 DIAGNOSIS — M17.11 UNILATERAL PRIMARY OSTEOARTHRITIS, RIGHT KNEE: Chronic | ICD-10-CM

## 2021-10-04 DIAGNOSIS — M50.20 OTHER CERVICAL DISC DISPLACEMENT, UNSPECIFIED CERVICAL REGION: ICD-10-CM

## 2021-10-04 LAB — BLD GP AB SCN SERPL QL: SIGNIFICANT CHANGE UP

## 2021-10-04 PROCEDURE — G0463: CPT

## 2021-10-04 RX ORDER — MULTIVIT-MIN/FERROUS GLUCONATE 9 MG/15 ML
1 LIQUID (ML) ORAL
Qty: 0 | Refills: 0 | DISCHARGE

## 2021-10-04 NOTE — H&P PST ADULT - NEGATIVE NEUROLOGICAL SYMPTOMS
no paresthesias/no generalized seizures/no focal seizures/no syncope/no tremors/no vertigo/no loss of sensation/no headache

## 2021-10-04 NOTE — H&P PST ADULT - PROBLEM SELECTOR PLAN 4
Pt c/o neck pain due to herniated disc. As per pt, he is under the care of a neurologist who is presently sick. He is not taking any medications presently. He will follow-up with another neurologist postop for management.

## 2021-10-04 NOTE — H&P PST ADULT - PROBLEM SELECTOR PLAN 2
Instructed to continue amlodipine and to take it with sips of water on day of surgery. As per patient, he was seen for clearance by PCP. Follow-up with PCP for management.

## 2021-10-04 NOTE — H&P PST ADULT - GASTROINTESTINAL DETAILS
normal/soft/nontender/no distention/bowel sounds normal/no bruit/no rebound tenderness/no rigidity/no organomegaly

## 2021-10-04 NOTE — H&P PST ADULT - NSICDXPASTSURGICALHX_GEN_ALL_CORE_FT
PAST SURGICAL HISTORY:  Arthropathy right shoulder    Arthropathy left shoulder surgery    Arthropathy right ankle surgery    Arthropathy of right knee surgery    History of robot-assisted laparoscopic radical prostatectomy suprapubic  on 8/2021

## 2021-10-04 NOTE — H&P PST ADULT - NSICDXPASTMEDICALHX_GEN_ALL_CORE_FT
PAST MEDICAL HISTORY:  Anxiety and depression     Enlarged prostate     Herniated cervical disc     HTN (hypertension)     Leg pain right side    Lumbar herniated disc     Neck pain     Shoulder pain      PAST MEDICAL HISTORY:  Anxiety and depression     Enlarged prostate     Herniated cervical disc     HTN (hypertension)     Incisional hernia     Leg pain right side    Lumbar herniated disc     Neck pain     Shoulder pain

## 2021-10-04 NOTE — H&P PST ADULT - NEGATIVE MUSCULOSKELETAL SYMPTOMS
no arthritis/no joint swelling/no myalgia/no muscle cramps/no muscle weakness/no stiffness/no neck pain

## 2021-10-04 NOTE — H&P PST ADULT - ASSESSMENT
This is a 61 year old male with PMH of Hypertension, lumbar and cervical herniated discs, PSH of robotic assisted suprapubic prostatectomy on 8/24/2020 who presents with incisional hernia. Pt is scheduled for laparoscopic incisional hernia repair possible open on 10/08/2021.

## 2021-10-04 NOTE — H&P PST ADULT - PROBLEM SELECTOR PLAN 3
[Chills] : chills [Fatigue] : fatigue [Shortness Of Breath] : shortness of breath [Cough] : cough [Negative] : Heme/Lymph [Fever] : no fever [Wheezing] : no wheezing [Dyspnea on Exertion] : no dyspnea on exertion [FreeTextEntry4] : HPI  Pt c/o low back pain due to herniated disc. As per pt, he is under the care of a neurologist who is presently sick. He is not taking any medications presently. He will follow-up with another neurologist postop for management.

## 2021-10-04 NOTE — H&P PST ADULT - PROBLEM SELECTOR PLAN 1
Laparoscopic incisional hernia repair possible open on 10/08/2021. Preoperative instructions discussed with pt and given to pt. Instructed pt to notify security when he arrives in the lobby of the hospital that he is here for surgery, that he will need someone to come to the hospital to pick him up after surgery, not to eat or drink anything after midnight the night before the surgery, to avoid NSAIDs such as Ibuprofen, Motrin, Aleve, Advil, naproxen before surgery, to take Tylenol if needed for pain, to report if he has been exposed to anyone with any contagious diseases including Covid-19 or if he is exhibiting any symptoms of COVID-19,  to keep appointment for COVID-19  test 3 days before surgery. Instructed about use of Chlorhexidine 4% soap before surgery. Verbalized understanding of instructions given.

## 2021-10-04 NOTE — H&P PST ADULT - HISTORY OF PRESENT ILLNESS
This is a 61 year old male with PMH of Hypertension, lumbar and cervical herniated discs, PSH of robotic assisted suprapubic prostatectomy on 8/24/2020 who presents with c/o lump above umbilicus. Pt reports minimal discomfort at site. Pt is scheduled for laparoscopic incisional hernia repair possible open on 10/08/2021.

## 2021-10-05 ENCOUNTER — APPOINTMENT (OUTPATIENT)
Dept: DISASTER EMERGENCY | Facility: CLINIC | Age: 61
End: 2021-10-05

## 2021-10-06 LAB — SARS-COV-2 N GENE NPH QL NAA+PROBE: NOT DETECTED

## 2021-10-07 ENCOUNTER — TRANSCRIPTION ENCOUNTER (OUTPATIENT)
Age: 61
End: 2021-10-07

## 2021-10-08 ENCOUNTER — APPOINTMENT (OUTPATIENT)
Dept: SURGERY | Facility: HOSPITAL | Age: 61
End: 2021-10-08
Payer: MEDICARE

## 2021-10-08 ENCOUNTER — OUTPATIENT (OUTPATIENT)
Dept: OUTPATIENT SERVICES | Facility: HOSPITAL | Age: 61
LOS: 1 days | End: 2021-10-08
Payer: MEDICARE

## 2021-10-08 VITALS
DIASTOLIC BLOOD PRESSURE: 72 MMHG | HEIGHT: 68 IN | WEIGHT: 186.07 LBS | SYSTOLIC BLOOD PRESSURE: 119 MMHG | TEMPERATURE: 98 F | OXYGEN SATURATION: 98 % | RESPIRATION RATE: 18 BRPM | HEART RATE: 75 BPM

## 2021-10-08 VITALS
DIASTOLIC BLOOD PRESSURE: 69 MMHG | RESPIRATION RATE: 16 BRPM | HEART RATE: 72 BPM | TEMPERATURE: 98 F | OXYGEN SATURATION: 98 % | SYSTOLIC BLOOD PRESSURE: 115 MMHG

## 2021-10-08 DIAGNOSIS — M12.9 ARTHROPATHY, UNSPECIFIED: Chronic | ICD-10-CM

## 2021-10-08 DIAGNOSIS — Z90.79 ACQUIRED ABSENCE OF OTHER GENITAL ORGAN(S): Chronic | ICD-10-CM

## 2021-10-08 DIAGNOSIS — Z01.818 ENCOUNTER FOR OTHER PREPROCEDURAL EXAMINATION: ICD-10-CM

## 2021-10-08 DIAGNOSIS — K43.2 INCISIONAL HERNIA WITHOUT OBSTRUCTION OR GANGRENE: ICD-10-CM

## 2021-10-08 DIAGNOSIS — M17.11 UNILATERAL PRIMARY OSTEOARTHRITIS, RIGHT KNEE: Chronic | ICD-10-CM

## 2021-10-08 LAB — BLD GP AB SCN SERPL QL: SIGNIFICANT CHANGE UP

## 2021-10-08 PROCEDURE — 86900 BLOOD TYPING SEROLOGIC ABO: CPT

## 2021-10-08 PROCEDURE — 86901 BLOOD TYPING SEROLOGIC RH(D): CPT

## 2021-10-08 PROCEDURE — 49654: CPT

## 2021-10-08 PROCEDURE — C1781: CPT

## 2021-10-08 PROCEDURE — C1889: CPT

## 2021-10-08 PROCEDURE — 49654: CPT | Mod: AS

## 2021-10-08 PROCEDURE — 86850 RBC ANTIBODY SCREEN: CPT

## 2021-10-08 PROCEDURE — 36415 COLL VENOUS BLD VENIPUNCTURE: CPT

## 2021-10-08 RX ORDER — KETOROLAC TROMETHAMINE 30 MG/ML
15 SYRINGE (ML) INJECTION EVERY 6 HOURS
Refills: 0 | Status: DISCONTINUED | OUTPATIENT
Start: 2021-10-08 | End: 2021-10-08

## 2021-10-08 RX ORDER — ONDANSETRON 8 MG/1
4 TABLET, FILM COATED ORAL ONCE
Refills: 0 | Status: DISCONTINUED | OUTPATIENT
Start: 2021-10-08 | End: 2021-10-08

## 2021-10-08 RX ORDER — AMLODIPINE BESYLATE 2.5 MG/1
1 TABLET ORAL
Qty: 0 | Refills: 0 | DISCHARGE

## 2021-10-08 RX ORDER — OXYCODONE HYDROCHLORIDE 5 MG/1
5 TABLET ORAL EVERY 6 HOURS
Refills: 0 | Status: DISCONTINUED | OUTPATIENT
Start: 2021-10-08 | End: 2021-10-08

## 2021-10-08 RX ORDER — FENTANYL CITRATE 50 UG/ML
25 INJECTION INTRAVENOUS
Refills: 0 | Status: DISCONTINUED | OUTPATIENT
Start: 2021-10-08 | End: 2021-10-08

## 2021-10-08 RX ORDER — FENTANYL CITRATE 50 UG/ML
50 INJECTION INTRAVENOUS
Refills: 0 | Status: DISCONTINUED | OUTPATIENT
Start: 2021-10-08 | End: 2021-10-08

## 2021-10-08 RX ORDER — OXYCODONE HYDROCHLORIDE 5 MG/1
1 TABLET ORAL
Qty: 12 | Refills: 0
Start: 2021-10-08 | End: 2021-10-10

## 2021-10-08 RX ADMIN — Medication 15 MILLIGRAM(S): at 15:46

## 2021-10-08 RX ADMIN — FENTANYL CITRATE 25 MICROGRAM(S): 50 INJECTION INTRAVENOUS at 14:24

## 2021-10-08 RX ADMIN — OXYCODONE HYDROCHLORIDE 5 MILLIGRAM(S): 5 TABLET ORAL at 15:31

## 2021-10-08 RX ADMIN — FENTANYL CITRATE 25 MICROGRAM(S): 50 INJECTION INTRAVENOUS at 13:48

## 2021-10-08 RX ADMIN — Medication 15 MILLIGRAM(S): at 15:31

## 2021-10-08 RX ADMIN — OXYCODONE HYDROCHLORIDE 5 MILLIGRAM(S): 5 TABLET ORAL at 14:54

## 2021-10-08 NOTE — BRIEF OPERATIVE NOTE - NSICDXBRIEFPROCEDURE_GEN_ALL_CORE_FT
PROCEDURES:  Laparoscopic surgical repair of incisional hernia 08-Oct-2021 12:24:38  Tanja Armando

## 2021-10-08 NOTE — ASU DISCHARGE PLAN (ADULT/PEDIATRIC) - CARE PROVIDER_API CALL
Lukas Meléndez)  Surgery  [address] [facility], [place] [address]  Phone: [phone]  Fax: [phone]  Follow Up Time:

## 2021-10-08 NOTE — ASU PATIENT PROFILE, ADULT - NSICDXPASTMEDICALHX_GEN_ALL_CORE_FT
PAST MEDICAL HISTORY:  Anxiety and depression     Enlarged prostate     Herniated cervical disc     HTN (hypertension)     Incisional hernia     Leg pain right side    Lumbar herniated disc     Neck pain     Shoulder pain

## 2021-10-08 NOTE — ASU PATIENT PROFILE, ADULT - BRAND OF COVID-19 VACCINATION
times daily (before meals) 180 tablet 0       Vitals:    11/26/18 1427   BP: (!) 160/80   Pulse: 92   Weight: 260 lb (117.9 kg)     Body mass index is 46.06 kg/m². Wt Readings from Last 3 Encounters:   11/26/18 260 lb (117.9 kg)   11/26/18 260 lb 12.8 oz (118.3 kg)   11/19/18 261 lb (118.4 kg)     BP Readings from Last 3 Encounters:   11/26/18 (!) 160/80   11/26/18 (!) 140/80   11/19/18 (!) 152/88          Objective:    CONSTITUTIONAL:  awake, alert, no apparent distress    LUNGS:  Resp easy and unlabored  ABDOMEN:  Incisions c/d/i, no erythema or induration, soft, non-distended, non-tender, voluntary guarding absent,  and hernia absent  MUSCULOSKELETAL: No edema  NEUROLOGIC:  Mental Status Exam:  Level of Alertness:   awake  Orientation:   person, place, time  SKIN: no erythema or induration      Pathology:  FINAL DIAGNOSIS:    Right colon, segmental resection:  - Invasive colonic adenocarcinoma invading submucosa, well  differentiated. - Margins not involved. - Pericolonic lymph nodes negative for metastatic carcinoma (0/25). COLON AND RECTUM: Resection including transanal disc excision of rectal  neoplasm  SPECIMEN  Specimen: Right colon  Procedure:  colectomy  Specify Specimen Length (cm): 26.5  Primary Tumor Site: Ascending  Macroscopic Tumor Perforation: Not present  TUMOR  Histologic Type: Adenocarcinoma  Histologic Grade: Low-grade (well differentiated)  Histologic Features Suggestive of Microsatellite Instability:   Intratumoral Lymphocytic Response (tumor-infiltrating lymphocytes): None   Peritumor Lymphocytic Response (Crohn-like response): None   Tumor Subtype and Differentiation: none  EXTENT  Tumor Size: Greatest dimension (cm) 0.5  Microscopic Tumor Extension: Tumor invades submucosa. MARGINS  All margins uninvolved by invasive carcinoma  Distance of invasive carcinoma from closest margin: 50 mm.  Specify  margin: Mesenteric margin.     Proximal Margin: Uninvolved by invasive carcinoma     Distance of tumor from margin: 7.5 cm   Distal Margin: Uninvolved by invasive carcinoma     Distance of tumor from margin: 19 cm    ACCESSORY FINDINGS  Treatment Effect (applicable to carcinomas treated with neoadjuvant  therapy): No prior treatment  Lymph-Vascular Invasion: Not present  Perineural Invasion: Not identified  Tumor Deposits (discontinuous extramural extension): Not present. Type of Polyp in Which Invasive Carcinoma Arose: Tubulovillous adenoma    Regional lymph nodes    Number of Lymph Nodes Examined: 25    Number of Lymph nodes Involved: 0    Pathologic Stage Classification (pTNM, AJCC 8th Edition)    Primary Tumor (pT):    pT1: Tumor invades submucosa  Regional Lymph Nodes (pN):   pN 0: Metastasis in 0 lymph node    Distant Metastasis (pM): Not applicable  SPECIAL STUDIES: None  ADDITIONAL NON-TUMOR FINDINGS: None        JIAJA/ERNESTO    ASSESSMENT:     Diagnosis Orders   1. Malignant neoplasm of ascending colon [facility])         PLAN:    Tramaine Trejo is doing well s/p laparoscopic right hemicolectomy. Her incisions are healing well. Eating and drinking normally. Bowels starting to solidify. Dr. Magana Garza to follow from an oncology standpoint. Will plan on having her follow up in 4 weeks. She is to continue with lifting restrictions for the next 4 weeks to reduce the chance of hernia.      Electronically signed by Dev Munguia MD on 11/26/2018 at 2:47 PM Pfizer dose 1 and 2

## 2021-10-18 PROBLEM — I10 ESSENTIAL (PRIMARY) HYPERTENSION: Chronic | Status: ACTIVE | Noted: 2021-10-04

## 2021-10-18 PROBLEM — K43.2 INCISIONAL HERNIA WITHOUT OBSTRUCTION OR GANGRENE: Chronic | Status: ACTIVE | Noted: 2021-10-04

## 2021-10-19 NOTE — HISTORY OF PRESENT ILLNESS
[de-identified] : Mr. EKTA LUZ is a 61 year  old male who was referred by PARVEEN Dominguez with the chief complaint of having an umbilical hernia.  He reports having this condition for 1 year. He denies any trauma to the area, fever, nausea, vomiting, distension, night sweats and  loss of appetite.  Symptoms aggravated by cough and straining.  - He reports normal bowel movements   -He reports  previous  robotic prostates surgery in 08/2020. \par \par

## 2021-10-19 NOTE — PLAN
[FreeTextEntry1] : Mr. LUZ  was told significance of findings, options, risks and benefits were explained.  Informed consent for laparoscopic/possible open  incisional hernia repair  and potential risks, benefits and alternatives (surgical options were discussed including non-surgical options or the option of no surgery) to the planned surgery were discussed in depth.  All surgical options were discussed including non-surgical treatments.  He wishes to proceed with surgery.  We will plan for surgery on at the next available date, pending any required insurance pre-certification or pre-approval. He agrees to obtain any necessary pre-operative evaluations and testing prior to surgery.\par Patient advised to seek immediate medical attention with any acute change in symptoms or with the development of any new or worsening symptoms.  Patient's questions and concerns addressed to patient's satisfaction, and patient verbalized an understanding of the information discussed.\par \par

## 2021-10-19 NOTE — CONSULT LETTER
[Dear  ___] : Dear  [unfilled], [Consult Letter:] : I had the pleasure of evaluating your patient, [unfilled]. [Please see my note below.] : Please see my note below. [Consult Closing:] : Thank you very much for allowing me to participate in the care of this patient.  If you have any questions, please do not hesitate to contact me. [Sincerely,] : Sincerely, [FreeTextEntry3] : Lukas Meléndez MD, FACS

## 2021-10-19 NOTE — PHYSICAL EXAM
[Abdominal Masses] : No abdominal masses [Abdomen Tenderness] : ~T ~M No abdominal tenderness [Alert] : alert [Oriented to Person] : oriented to person [Oriented to Place] : oriented to place [Oriented to Time] : oriented to time [de-identified] : He  is alert, well-groomed, and in NAD\par   [de-identified] : anicteric.  Nasal mucosa pink, septum midline. Oral mucosa pink.  Tongue midline, Pharynx without exudates.\par   [de-identified] : Neck supple. Trachea midline. Thyroid isthmus barely palpable, lobes not felt.\par   [de-identified] : large  reducible incisional supraumbilical hernia, mildly tender.  The defect appears to be relatively small  and the skin overlying the hernia is normal \par

## 2021-11-01 ENCOUNTER — APPOINTMENT (OUTPATIENT)
Dept: SURGERY | Facility: CLINIC | Age: 61
End: 2021-11-01
Payer: MEDICARE

## 2021-11-01 VITALS — TEMPERATURE: 98 F

## 2021-11-01 PROCEDURE — 99024 POSTOP FOLLOW-UP VISIT: CPT

## 2021-11-01 NOTE — HISTORY OF PRESENT ILLNESS
[de-identified] : Mr. LUZ  is s/p Laparoscopic surgical repair of incisional hernia on 10/08/2021. Today Mr. LUZ offers no complaints. patient reports no fever, chills,  or  pain. His  surgical wounds are  healing well. No signs of inflammation, infection or exudate. Patient reports good bowel movements and appetite.

## 2021-11-01 NOTE — ASSESSMENT
[FreeTextEntry1] : Mr. LUZ is doing well, with excellent post-operative recovery. All surgical incisions are healing well and as expected. There is no evidence of infection or complication, and he is progressing as expected. Post-operative wound care, activity, restrictions and precautions reinforced. Patient instructed to refrain from any heavy lifting greater than 10-15 pounds for at least 4-6 weeks post-operatively. Patient's questions and concerns addressed to patient's satisfaction.\par

## 2021-11-01 NOTE — PHYSICAL EXAM
[Calm] : calm [de-identified] : He  is alert, well-groomed, and in NAD\par   [de-identified] : Surgical wounds are  healing well.   no signs of  inflammation or infection.

## 2021-11-29 NOTE — DISCHARGE NOTE PROVIDER - PROVIDER TOKENS
MORNING BEFORE  BREAKFAST as needed 90 tablet 3    amLODIPine (NORVASC) 10 MG tablet TAKE 1 TABLET BY MOUTH  DAILY 90 tablet 3    hydroCHLOROthiazide (MICROZIDE) 12.5 MG capsule TAKE 1 CAPSULE BY MOUTH  DAILY 90 capsule 3    montelukast (SINGULAIR) 10 MG tablet TAKE 1 TABLET BY MOUTH  DAILY 90 tablet 3    pravastatin (PRAVACHOL) 40 MG tablet Take 1 tablet by mouth daily 90 tablet 3    SYMBICORT 160-4.5 MCG/ACT AERO INHALE 2 PUFFS INTO THE LUNGS BID. RINSE MOUTH AFTER USE 3 Inhaler 3    albuterol (PROVENTIL) (2.5 MG/3ML) 0.083% nebulizer solution INHALE THE CONTENTS OF ONE VIAL BY NEBULIZATION UP TO FOUR TIMES DAILY AS NEEDED FOR QUICK RELIEF ONLY      melatonin 3 MG TABS tablet Take 5 mg by mouth daily       PROAIR  (90 BASE) MCG/ACT inhaler INHALE 2 PUFFS INTO THE LUNGS Q 4 TO 6 H PRN  5     No current facility-administered medications for this visit. No Known Allergies    Health Maintenance   Topic Date Due    Shingles Vaccine (1 of 2) 01/18/2022 (Originally 12/15/2011)    Colon cancer screen colonoscopy  04/30/2022    Lipid screen  05/08/2022    Potassium monitoring  05/08/2022    Creatinine monitoring  05/08/2022    Pneumococcal 0-64 years Vaccine (2 of 2 - PPSV23) 12/15/2026    DTaP/Tdap/Td vaccine (2 - Td or Tdap) 04/10/2031    Flu vaccine  Completed    COVID-19 Vaccine  Completed    Hepatitis C screen  Completed    HIV screen  Completed    Hepatitis A vaccine  Aged Out    Hepatitis B vaccine  Aged Out    Hib vaccine  Aged Out    Meningococcal (ACWY) vaccine  Aged Out       Subjective:      Review of Systems   Constitutional: Negative for appetite change, chills, diaphoresis, fatigue and fever. Eyes: Negative for visual disturbance. Respiratory: Negative for cough, chest tightness and shortness of breath. Cardiovascular: Negative for chest pain, palpitations and leg swelling. Gastrointestinal: Negative for abdominal pain, constipation, diarrhea, nausea and vomiting. Endocrine: Negative for polydipsia, polyphagia and polyuria. Musculoskeletal: Negative for arthralgias and myalgias. Skin: Negative for rash. Neurological: Negative for dizziness, weakness, light-headedness, numbness and headaches. Hematological: Negative for adenopathy. Psychiatric/Behavioral: Negative for dysphoric mood and sleep disturbance. The patient is not nervous/anxious. Objective:      Physical Exam  Vitals and nursing note reviewed. Constitutional:       General: He is not in acute distress. Appearance: Normal appearance. He is well-developed. He is not ill-appearing or diaphoretic. HENT:      Head: Normocephalic and atraumatic. Eyes:      Conjunctiva/sclera: Conjunctivae normal.   Cardiovascular:      Rate and Rhythm: Normal rate and regular rhythm. Heart sounds: Normal heart sounds. Comments: No LE edema  Pulmonary:      Effort: Pulmonary effort is normal.      Breath sounds: Normal breath sounds. Musculoskeletal:      Cervical back: Neck supple. Skin:     General: Skin is warm and dry. Neurological:      General: No focal deficit present. Mental Status: He is alert and oriented to person, place, and time. Mental status is at baseline. Psychiatric:         Mood and Affect: Mood normal.         Behavior: Behavior normal.         Thought Content: Thought content normal.         Judgment: Judgment normal.         Assessment:       Diagnosis Orders   1. Essential hypertension     2.  Mild persistent asthma without complication       Wt Readings from Last 3 Encounters:   11/29/21 223 lb (101.2 kg)   05/05/21 230 lb 9.6 oz (104.6 kg)   04/10/21 229 lb (103.9 kg)     BP Readings from Last 3 Encounters:   11/29/21 130/82   05/05/21 130/84   04/10/21 109/73     Lab Results   Component Value Date    CHOL 174 05/08/2021    CHOL 190 05/21/2020     Lab Results   Component Value Date    TRIG 80 05/08/2021    TRIG 117 05/21/2020     Lab Results   Component Value Date    HDL 33 (A) 05/08/2021    HDL 35 05/21/2020     Lab Results   Component Value Date    LDLCALC 125 05/08/2021    LDLCALC 132 05/21/2020     Lab Results   Component Value Date    VLDL 16 05/08/2021    VLDL 23 05/21/2020     Lab Results   Component Value Date    CHOLHDLRATIO 5.3 05/08/2021    CHOLHDLRATIO 5.4 05/21/2020     Lab Results   Component Value Date    PSA 0.60 05/08/2021       Chemistry        Component Value Date/Time     05/08/2021 0000    K 3.8 05/08/2021 0000     05/08/2021 0000    CO2 26 05/08/2021 0000    BUN 12 05/08/2021 0000    CREATININE 0.92 05/08/2021 0000    CREATININE 1.0 05/21/2020 0000        Component Value Date/Time    CALCIUM 9.0 05/08/2021 0000    ALKPHOS 61 05/08/2021 0000    AST 15 05/08/2021 0000    ALT 13 05/08/2021 0000    BILITOT 0.5 05/08/2021 0000            Plan:      Return in about 6 months (around 5/29/2022) for return for bp check/HTN, MED CHECK. Orders Placed This Encounter   Medications    pantoprazole (PROTONIX) 40 MG tablet     Sig: TAKE 1 TABLET BY MOUTH IN  THE MORNING BEFORE  BREAKFAST as needed     Dispense:  90 tablet     Refill:  3     Requesting 1 year supply   The 10-year ASCVD risk score (Nury Martino, et al., 2013) is: 11.3%    Values used to calculate the score:      Age: 63 years      Sex: Male      Is Non- : No      Diabetic: No      Tobacco smoker: No      Systolic Blood Pressure: 781 mmHg      Is BP treated: Yes      HDL Cholesterol: 33 mg/dL      Total Cholesterol: 174 mg/dL  Patient states pantoprazole all is not needed every day and we have advised him to decrease this down to just as needed  LF diet, avoid spicy/greasy foods, alcohol, caffeine, and sit upright after meals for 2-3 hours, small portions of food throughout the day, avoid large meals.    He also states the Vesicare is not helpful at all and is going to stop this medication  Continue all other medications for asthma hypertension and hyperlipidemia as those numbers are all stable at this time  Low-fat diet, weight loss and regular cardiovascular exercise advised  Labs due at follow-up appointment    Patient given educational materials - see patient instructions. Discussed use,benefit, and side effects of prescribed medications. All patient questions answered. Pt voiced understanding. Reviewed health maintenance. Instructed to continue currentmedications, diet and exercise.     Electronically signed by PEDRO LUIS Garcia CNP, CNP on 11/29/2021 at 10:30 AM PROVIDER:[TOKEN:[[id number]],FOLLOWUP:[1 week]]

## 2022-01-27 PROBLEM — K43.2 INCISIONAL HERNIA WITHOUT OBSTRUCTION OR GANGRENE: Status: ACTIVE | Noted: 2021-09-23

## 2022-02-03 ENCOUNTER — APPOINTMENT (OUTPATIENT)
Dept: SURGERY | Facility: CLINIC | Age: 62
End: 2022-02-03
Payer: MEDICARE

## 2022-02-03 VITALS
BODY MASS INDEX: 28.52 KG/M2 | HEART RATE: 67 BPM | WEIGHT: 187.56 LBS | SYSTOLIC BLOOD PRESSURE: 145 MMHG | DIASTOLIC BLOOD PRESSURE: 84 MMHG | OXYGEN SATURATION: 94 %

## 2022-02-03 VITALS — TEMPERATURE: 97.2 F

## 2022-02-03 DIAGNOSIS — K43.2 INCISIONAL HERNIA W/OUT OBSTRUCTION OR GANGRENE: ICD-10-CM

## 2022-02-03 PROCEDURE — 99213 OFFICE O/P EST LOW 20 MIN: CPT

## 2022-02-03 NOTE — PLAN
[FreeTextEntry1] : Mr. LUZ will follow up  if needed. Warning signs, follow up, and restrictions were discussed with the patient.

## 2022-02-03 NOTE — PHYSICAL EXAM
[Alert] : alert [Oriented to Person] : oriented to person [Oriented to Place] : oriented to place [Oriented to Time] : oriented to time [Calm] : calm [de-identified] : He  is alert, well-groomed, and in NAD\par   [de-identified] : anicteric.  Nasal mucosa pink, septum midline. Oral mucosa pink.  Tongue midline, Pharynx without exudates.\par   [de-identified] : Neck supple. Trachea midline. Thyroid isthmus barely palpable, lobes not felt.\par   [de-identified] : Surgical wounds  healed  well.   no signs of   recurrence

## 2022-02-03 NOTE — ASSESSMENT
[FreeTextEntry1] : Mr. LUZ is doing well, with excellent post-operative recovery. All surgical incisions are healing well and as expected. There is no evidence of recurrence or  complication, and he is progressing as expected.   Patient's questions and concerns addressed to patient's satisfaction.\par

## 2022-02-03 NOTE — HISTORY OF PRESENT ILLNESS
[de-identified] : Mr. LUZ  is s/p Laparoscopic surgical repair of incisional hernia on 10/08/2021. Today Mr. LUZ offers no complaints. patient reports no   pain. His  surgical wounds  healed well. No signs recurrence. Patient reports good bowel movements and appetite.

## 2023-02-13 NOTE — ASU PREOP CHECKLIST - HEIGHT IN CM
Problem: Patient Education: Go to Patient Education Activity  Goal: Patient/Family Education  Outcome: Progressing Towards Goal     Problem: TIA/CVA Stroke: 0-24 hours  Goal: Off Pathway (Use only if patient is Off Pathway)  Outcome: Progressing Towards Goal  Goal: Activity/Safety  Outcome: Progressing Towards Goal  Goal: Consults, if ordered  Outcome: Progressing Towards Goal  Goal: Diagnostic Test/Procedures  Outcome: Progressing Towards Goal  Goal: Nutrition/Diet  Outcome: Progressing Towards Goal  Goal: Discharge Planning  Outcome: Progressing Towards Goal  Goal: Medications  Outcome: Progressing Towards Goal  Goal: Respiratory  Outcome: Progressing Towards Goal  Goal: Treatments/Interventions/Procedures  Outcome: Progressing Towards Goal  Goal: Minimize risk of bleeding post-thrombolytic infusion  Outcome: Progressing Towards Goal  Goal: Monitor for complications post-thrombolytic infusion  Outcome: Progressing Towards Goal  Goal: Psychosocial  Outcome: Progressing Towards Goal  Goal: *Hemodynamically stable  Outcome: Progressing Towards Goal  Goal: *Neurologically stable  Description: Absence of additional neurological deficits    Outcome: Progressing Towards Goal  Goal: *Verbalizes anxiety and depression are reduced or absent  Outcome: Progressing Towards Goal  Goal: *Absence of Signs of Aspiration on Current Diet  Outcome: Progressing Towards Goal  Goal: *Absence of deep venous thrombosis signs and symptoms(Stroke Metric)  Outcome: Progressing Towards Goal  Goal: *Ability to perform ADLs and demonstrates progressive mobility and function  Outcome: Progressing Towards Goal  Goal: *Stroke education started(Stroke Metric)  Outcome: Progressing Towards Goal  Goal: *Dysphagia screen performed(Stroke Metric)  Outcome: Progressing Towards Goal  Goal: *Rehab consulted(Stroke Metric)  Outcome: Progressing Towards Goal     Problem: Falls - Risk of  Goal: *Absence of Falls  Description: Document Aylin Fall Risk and appropriate interventions in the flowsheet. Outcome: Progressing Towards Goal  Note: Fall Risk Interventions:       Mentation Interventions: Adequate sleep, hydration, pain control    Medication Interventions: Bed/chair exit alarm, Patient to call before getting OOB    Elimination Interventions:  Toileting schedule/hourly rounds, Call light in reach [phone]

## 2023-11-21 NOTE — ASU PATIENT PROFILE, ADULT - ABILITY TO HEAR (WITH HEARING AID OR HEARING APPLIANCE IF NORMALLY USED):
Diagnosis: Breast  Regimen: Taxol  Cycle/Day: C3/D15  Is this a C1D1 appt?  No    Dr. Stevens is supervising clinician today.    ECOG:   ECOG [11/21/23 1246]   ECOG Performance Status 0       Review and verified Advanced Directives: Yes: Advance Directive is in chart     Verified if patient has state DNR bracelet on: No; Full Code    Nursing Assessment:   A focused nursing assessment addressing the toxicity of chemotherapy was performed and the patient reports the following:    Anxiety/Depression/Insomnia: NO  Pain: NO    Toxicity Assessment    Auditory/Ear  Assessment: Yes (Within Defined Limits)    Cardiac General  Assessment: Yes (w/ Exceptions to WDL)  Palpitations: Grade 1 (ongoing)    Constitutional  Assessment: Yes (Within Defined Limits)    Dermatology/Skin  Assessment: Yes (Within Defined Limits)    Endocrine  Assessment: Yes (Within Defined Limits)    Gastrointestinal  Assessment: Yes (Within Defined Limits)    Hemorrhage/Bleeding  Assessment: Yes (Within Defined Limits)    Infection  Assessment: Yes (Within Defined Limits)    Lymphatics  Assessment: Yes (Within Defined Limits)    Musculoskeletal  Assessment: Yes (Within Defined Limits)    Neurology  Assessment: Yes (w/ Exceptions to WDL)  Paresthesia: Grade 1    Ocular  Assessment: Yes (Within Defined Limits)    Pain  Assessment: Yes (Within Defined Limits)    Pulmonary/Upper Respiratory  Assessment: Yes (w/ Exceptions to WDL)  Dyspnea: Grade 1        Patient confirms receipt of a signed copy of Anti-Cancer Treatment consent and verbalizes understanding of treatment plan: Yes.     Pre-Treatment: Treatment consent signed  Patient has valid pre-authorization  VS completed  Height and weight verified  BSA independently double checked & verified by two practitioners  Premed orders, including hydration, are verified prior to administration  Treatment parameters verified in patient protocol  Lab results checked   Chemotherapy doses independently doubled checked  & verified by two practitioners    Treatment: I have reviewed the following with the patient:  Name of chemo drug, duration and route of infusion, and reportable infusion-related symptoms.  Chemotherapy has not ; double checked & verified by two practitioners  Appearance and physical integrity of drugs meets standard of drug monograph; double checked & verified by two practitioners  Rate set on infusion pump is in alignment with ordered rate; double checked & verified by two practitioners  Blood return confirmed before, during and after treatment administered  Infusion pump used for non-vesicant drugs  Drugs were administered in proper sequencing  Refer to LDA and MAR for line assessment and medication administration    Post Treatment: Treatment tolerated well; no adverse reaction    Transfusion: Not needed    Integrative Medicine: No    Oral Chemotherapy: No    Education: No new instructions needed    Next appointment scheduled:   Patient instructed to call the office with any questions or concerns.    Patient Discharged: patient discharged to home per self, ambulatory      Per Dr. Stevens's plan of care:  - treatment today     - lab (CBC, CMP), MD, treatment (pembrolizumab) in 3 weeks   Lab orders entered,  --Scheduled on  for lab, MD and TX.    - lab (CBC, CMP, TSH), MD, treatment (pembrolizumab) in 6 weeks  Lab orders entered,  --Scheduled on  for lab, MD and TX.    - lab (CBC, CMP), MD, treatment (pembrolizumab) in 9 weeks . CT CAP prior to this visit    Lab orders entered,  --Scheduled on  for lab, MD and TX.  Scan order entered by provider. CS Will call patient to schedule         Adequate: hears normal conversation without difficulty

## 2025-03-10 ENCOUNTER — EMERGENCY (EMERGENCY)
Facility: HOSPITAL | Age: 65
LOS: 1 days | Discharge: HOME | End: 2025-03-10
Attending: STUDENT IN AN ORGANIZED HEALTH CARE EDUCATION/TRAINING PROGRAM
Payer: MEDICARE

## 2025-03-10 VITALS
HEART RATE: 79 BPM | OXYGEN SATURATION: 96 % | RESPIRATION RATE: 18 BRPM | SYSTOLIC BLOOD PRESSURE: 154 MMHG | DIASTOLIC BLOOD PRESSURE: 89 MMHG | TEMPERATURE: 98 F

## 2025-03-10 VITALS
SYSTOLIC BLOOD PRESSURE: 156 MMHG | TEMPERATURE: 98 F | DIASTOLIC BLOOD PRESSURE: 98 MMHG | HEART RATE: 70 BPM | RESPIRATION RATE: 17 BRPM | OXYGEN SATURATION: 98 % | WEIGHT: 175.93 LBS

## 2025-03-10 DIAGNOSIS — M12.9 ARTHROPATHY, UNSPECIFIED: Chronic | ICD-10-CM

## 2025-03-10 DIAGNOSIS — M17.11 UNILATERAL PRIMARY OSTEOARTHRITIS, RIGHT KNEE: Chronic | ICD-10-CM

## 2025-03-10 DIAGNOSIS — Z90.79 ACQUIRED ABSENCE OF OTHER GENITAL ORGAN(S): Chronic | ICD-10-CM

## 2025-03-10 PROCEDURE — 73562 X-RAY EXAM OF KNEE 3: CPT | Mod: 26,LT

## 2025-03-10 PROCEDURE — 73130 X-RAY EXAM OF HAND: CPT | Mod: 26,50

## 2025-03-10 PROCEDURE — 73130 X-RAY EXAM OF HAND: CPT

## 2025-03-10 PROCEDURE — 90471 IMMUNIZATION ADMIN: CPT

## 2025-03-10 PROCEDURE — 73562 X-RAY EXAM OF KNEE 3: CPT

## 2025-03-10 PROCEDURE — 90715 TDAP VACCINE 7 YRS/> IM: CPT

## 2025-03-10 PROCEDURE — 70486 CT MAXILLOFACIAL W/O DYE: CPT | Mod: MC

## 2025-03-10 PROCEDURE — 99291 CRITICAL CARE FIRST HOUR: CPT

## 2025-03-10 PROCEDURE — 71046 X-RAY EXAM CHEST 2 VIEWS: CPT

## 2025-03-10 PROCEDURE — 70486 CT MAXILLOFACIAL W/O DYE: CPT | Mod: 26

## 2025-03-10 PROCEDURE — 72125 CT NECK SPINE W/O DYE: CPT | Mod: MC

## 2025-03-10 PROCEDURE — 71046 X-RAY EXAM CHEST 2 VIEWS: CPT | Mod: 26

## 2025-03-10 PROCEDURE — 70450 CT HEAD/BRAIN W/O DYE: CPT | Mod: 26

## 2025-03-10 PROCEDURE — 72125 CT NECK SPINE W/O DYE: CPT | Mod: 26

## 2025-03-10 PROCEDURE — 99291 CRITICAL CARE FIRST HOUR: CPT | Mod: 25

## 2025-03-10 PROCEDURE — 70450 CT HEAD/BRAIN W/O DYE: CPT | Mod: MC

## 2025-03-10 RX ORDER — ACETAMINOPHEN 500 MG/5ML
650 LIQUID (ML) ORAL ONCE
Refills: 0 | Status: COMPLETED | OUTPATIENT
Start: 2025-03-10 | End: 2025-03-10

## 2025-03-10 RX ORDER — CLOSTRIDIUM TETANI TOXOID ANTIGEN (FORMALDEHYDE INACTIVATED), CORYNEBACTERIUM DIPHTHERIAE TOXOID ANTIGEN (FORMALDEHYDE INACTIVATED), BORDETELLA PERTUSSIS TOXOID ANTIGEN (GLUTARALDEHYDE INACTIVATED), BORDETELLA PERTUSSIS FILAMENTOUS HEMAGGLUTININ ANTIGEN (FORMALDEHYDE INACTIVATED), BORDETELLA PERTUSSIS PERTACTIN ANTIGEN, AND BORDETELLA PERTUSSIS FIMBRIAE 2/3 ANTIGEN 5; 2; 2.5; 5; 3; 5 [LF]/.5ML; [LF]/.5ML; UG/.5ML; UG/.5ML; UG/.5ML; UG/.5ML
0.5 INJECTION, SUSPENSION INTRAMUSCULAR ONCE
Refills: 0 | Status: COMPLETED | OUTPATIENT
Start: 2025-03-10 | End: 2025-03-10

## 2025-03-10 RX ORDER — AMOXICILLIN AND CLAVULANATE POTASSIUM 500; 125 MG/1; MG/1
1 TABLET, FILM COATED ORAL
Qty: 14 | Refills: 0
Start: 2025-03-10 | End: 2025-03-16

## 2025-03-10 RX ADMIN — Medication 650 MILLIGRAM(S): at 21:46

## 2025-03-10 RX ADMIN — CLOSTRIDIUM TETANI TOXOID ANTIGEN (FORMALDEHYDE INACTIVATED), CORYNEBACTERIUM DIPHTHERIAE TOXOID ANTIGEN (FORMALDEHYDE INACTIVATED), BORDETELLA PERTUSSIS TOXOID ANTIGEN (GLUTARALDEHYDE INACTIVATED), BORDETELLA PERTUSSIS FILAMENTOUS HEMAGGLUTININ ANTIGEN (FORMALDEHYDE INACTIVATED), BORDETELLA PERTUSSIS PERTACTIN ANTIGEN, AND BORDETELLA PERTUSSIS FIMBRIAE 2/3 ANTIGEN 0.5 MILLILITER(S): 5; 2; 2.5; 5; 3; 5 INJECTION, SUSPENSION INTRAMUSCULAR at 21:46

## 2025-03-10 RX ADMIN — Medication 650 MILLIGRAM(S): at 23:58

## 2025-03-10 NOTE — ED PROVIDER NOTE - PROGRESS NOTE DETAILS
patient extraocular movement intact no double vision no change in vision with eye movement CT showing orbital fracture and maxillofacial fracture OMFS called consulted stable for outpatient follow-up patient given return precautions are to follow PMD instructed return if noting new or worsening symptoms including pain nausea vomiting change in mental status double vision blurry vision or other new symptoms neurovasc intact on discharge

## 2025-03-10 NOTE — ED ADULT NURSE NOTE - ED STAT RN HANDOFF DETAILS
Patient verbalized understanding D/C instructions to: Follow up with facial surgeon & return for sudden or worsening symptoms.

## 2025-03-10 NOTE — ED ADULT NURSE NOTE - AS PAIN REST
5 (moderate pain) W Plasty Text: The lesion was extirpated to the level of the fat with a #15 scalpel blade.  Given the location of the defect, shape of the defect and the proximity to free margins a W-plasty was deemed most appropriate for repair.  Using a sterile surgical marker, the appropriate transposition arms of the W-plasty were drawn incorporating the defect and placing the expected incisions within the relaxed skin tension lines where possible.    The area thus outlined was incised deep to adipose tissue with a #15 scalpel blade.  The skin margins were undermined to an appropriate distance in all directions utilizing iris scissors.  The opposing transposition arms were then transposed into place in opposite direction and anchored with interrupted buried subcutaneous sutures.
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
## 2025-03-10 NOTE — ED PROVIDER NOTE - PHYSICAL EXAMINATION
abrasion noted bilateral knuckles no wrists or bony tenderness in upper extremity.  Abrasion noted to left knee no deformity      left Chiqui -orbital ecchymosis+ abrasion noted bilateral knuckles no wrists or bony tenderness in upper extremity.  Abrasion noted to left knee no deformity      left Chiqui -orbital ecchymosis+    Abrasion noted to nasal bridge no laceration noted
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
## 2025-03-10 NOTE — ED PROVIDER NOTE - PATIENT PORTAL LINK FT
You can access the FollowMyHealth Patient Portal offered by [facility] by registering at the following website: [email or web address]. By joining [facility]’s FollowMyHealth portal, you will also be able to view your health information using other applications (apps) compatible with our system.

## 2025-03-10 NOTE — ED PROVIDER NOTE - NSFOLLOWUPINSTRUCTIONS_ED_ALL_ED_FT
[facility] ICU RESPIRATORY NOTE  Date of Admission: 8/30/17  Date of Intubation (most recent): 8/30/17  Reason for Mechanical Ventilation: CVA/Airway protection  Number of Days on Mechanical Ventilation: 2  Met Criteria for Pressure Support Trial: No  Length of Pressure Support Trial:  Reason for Stopping Pressure Support Trial:  Reason for No Pressure Support Trial: Per MD    Significant Events Today: MRI this AM    ABG Results:     Recent Labs  Lab 08/30/17  1641   PH 7.37   PCO2 37   PO2 156*   HCO3 21   O2PER 45%     Ventilation Mode: CMV/AC  FiO2 (%): 45 %  Rate Set (breaths/minute): 16 breaths/min  Tidal Volume Set (mL): 500 mL  PEEP (cm H2O): 5 cmH2O  Oxygen Concentration (%): 45 %  Resp: 16    ETT appearance on chest x-ray: In good position    Plan:  Full support for now    Daniel Chaudhari RT      Maxillofacial Fracture  A maxillofacial fracture, also called a midface fracture, is a break in the bones of the middle part of the face that form the upper jaw (maxilla). These bones include:  The maxilla.  The cheekbones.  The lower rim and floor of the eye socket (inferior orbital or orbital floor).  The opening to the nasal passages (nasal cavity).  Sometimes, maxillofacial fractures involve multiple facial bones, also called complex fractures, and may partially or completely detach from the skull ([place] fractures). [place] fractures can be very severe and can be life-threatening.    What are the causes?  Maxillofacial fractures are usually caused by strong, blunt force to the midface area. Causes include:  Motor vehicle accidents.  Contact sports accidents.  Combat sports or martial arts.  Injuries in sports that use hard balls or bats.  Falls.  Workplace accidents.  Violent assaults.  What are the signs or symptoms?  Symptoms of this condition include:  Swelling, bruising, and pain, or tenderness of the face.  Bleeding or blood clots in the nose or mouth.  Clear drainage from the nose.  A change in the appearance of the face (facial deformity) and numbness.  A change in how the teeth fit together (malocclusion).  Inability to close the mouth at all.  Double vision, blurry vision, or inability to move the affected eye normally.  Bleeding into the lining of the eyelid or white area of the eye (subconjunctival hemorrhage).  Trouble breathing, swallowing, or speaking.  How is this diagnosed?  This condition may be diagnosed based on your symptoms and a physical exam. The exam involves checking for:  Airway blockage and any life-threatening bleeding.  Facial deformity, such as a gap or dent in the upper jaw that can be felt or moved, or widening and flattening of the face.  Vision problems.  Numbness or paralysis of eye muscles or facial muscles, or both.  Damage to the teeth and to the inside of the mouth and nose.  You may also have tests, such as X-rays or a CT scan, to confirm your diagnosis and determine how severe your fracture is.    How is this treated?  Early treatment    Treatment depends on how severe the fracture is and where it is found. Treatment may start in the emergency room to make sure blood clots or swelling do not block breathing. Treatments may include:  Endotracheal tube. This is a breathing tube that may be put through the nose into the windpipe.  Tracheostomy. This procedure involves making an opening in the lower windpipe to put in a breathing tube.  Antibiotic medicines, pain medicines, and medicines to reduce swelling.  Treatments specific to the type of fracture.  Maxillomandibular fixation    In most cases, this condition may require a procedure to wire the upper teeth to the lower teeth (maxillomandibular fixation). You may need to have your teeth wired together for several weeks to stabilize the fracture during healing. For minor fractures, this may be the only treatment needed.    Fractures that are out of position (displaced) or unstable may require open reduction. This is a surgery to move the broken bones back into position and then support them with plates and screws or wires.    Follow these instructions at home:  Medicines    Take over-the-counter and prescription medicines only as told by your health care provider.  Take your antibiotic medicine as told by your health care provider. Do not stop taking the antibiotic even if you start to feel better.  Maxillomandibular fixation care    If your teeth have been wired together:  Follow instructions for caring for your mouth and teeth (oral hygiene).  Make sure you know what to do if you vomit. You may be given a  to cut the wires off your teeth.  Follow instructions from your health care provider about eating or drinking restrictions. You will need to remain on a liquid and pureed food diet while your teeth are wired together.  Incision care    Two stitched incisions. One is normal. The other is red with pus and infected.  If you have facial incisions, follow instructions from your health care provider about how to take care of your incisions. Make sure you:  Wash your hands with soap and water for at least 20 seconds before and after you change your bandage (dressing). If soap and water are not available, use hand .  Change your dressing as told by your health care provider.  Leave stitches (sutures), skin glue, or adhesive strips in place. These skin closures may need to stay in place for 2 weeks or longer. If adhesive strip edges start to loosen and curl up, you may trim the loose edges. Do not remove adhesive strips completely unless your health care provider tells you to do that.  Check your incision area every day for signs of infection. Check for:  More redness, swelling, or pain.  More fluid or blood.  Warmth.  Pus or a bad smell.  Managing pain, stiffness, and swelling    A bag of ice on a towel on the skin.   If directed, put ice on the affected area. To do this:  Put ice in a plastic bag.  Place a towel between your skin and the bag.  Leave the ice on for 20 minutes, 2–3 times a day.  Remove the ice if your skin turns bright red. This is very important. If you cannot feel pain, heat, or cold, you have a greater risk of damage to the area.  Raise (elevate) your head above the level of your heart while you are sitting or lying down.  General instructions    Do not take baths, swim, or use a hot tub until your health care provider approves. Ask your health care provider if you may take showers. You may only be allowed to take sponge baths.  Return to your normal activities as told by your health care provider. Ask your health care provider what activities are safe for you.  Do not use any products that contain nicotine or tobacco. These products include cigarettes, chewing tobacco, and vaping devices, such as e-cigarettes. If you need help quitting, ask your health care provider.  Do not drink alcohol.  Do not lift anything that is heavier than 10 lb (4.5 kg), or the limit that you are told, until your health care provider says that it is safe.  Keep all follow-up visits. This is important. This includes visits to have sutures removed and wires cut from your teeth.  Contact a health care provider if:  You have chills or a fever.  You notice any signs of infection. These include redness, increased pain, or foul-smelling discharge.  Your pain is not controlled with medicine.  Get help right away if:  You have trouble breathing.  You have a sudden increase in swelling.  You need to cut the wires off your teeth because of vomiting.  These symptoms may represent a serious problem that is an emergency. Do not wait to see if the symptoms will go away. Get medical help right away. Call your local emergency services (911 in the [place] Do not drive yourself to the hospital.    Summary  A maxillofacial fracture is a break in the bones of the middle part of your face. This injury is usually caused by strong, blunt force to the midface area. Fractures in these bones may interfere with breathing, vision, chewing, and talking. Maxillofacial fractures can be very severe.  Treatment of maxillofacial fractures depends on the severity and location of the fracture. Treatment may start in the emergency room to make sure that blood clots or swelling are not blocking breathing.  In most cases, this condition may require a procedure to wire the upper teeth to the lower teeth (maxillomandibular fixation).  Severe fractures may require a surgical procedure (open reduction) to move the broken bones back into place and put in plates and screws or wires.  Follow all home care instructions and keep all follow-up visits.  This information is not intended to replace advice given to you by your health care provider. Make sure you discuss any questions you have with your health care provider.

## 2025-03-10 NOTE — ED PROVIDER NOTE - CARE PROVIDER_API CALL
Kalyan Lezama  Oral/Maxillofacial Surgery  [id number] [place] [id number]  Phone: [phone]  Fax: [phone]  Follow Up Time:
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
## 2025-03-10 NOTE — ED PROVIDER NOTE - IN ACCORDANCE WITH [place] STATE LAW, WE OFFER EVERY PATIENT A HEPATITIS C TEST. WOULD YOU LIKE TO BE TESTED TODAY?
Opt out Imiquimod Counseling:  I discussed with the patient the risks of imiquimod including but not limited to erythema, scaling, itching, weeping, crusting, and pain.  Patient understands that the inflammatory response to imiquimod is variable from person to person and was educated regarded proper titration schedule.  If flu-like symptoms develop, patient knows to discontinue the medication and contact us.